# Patient Record
Sex: FEMALE | Race: WHITE | Employment: FULL TIME | URBAN - NONMETROPOLITAN AREA
[De-identification: names, ages, dates, MRNs, and addresses within clinical notes are randomized per-mention and may not be internally consistent; named-entity substitution may affect disease eponyms.]

---

## 2018-07-09 ENCOUNTER — HOSPITAL ENCOUNTER (OUTPATIENT)
Dept: WOMENS IMAGING | Age: 53
Discharge: HOME | End: 2018-07-11
Payer: COMMERCIAL

## 2018-07-09 DIAGNOSIS — Z12.31 VISIT FOR SCREENING MAMMOGRAM: ICD-10-CM

## 2018-07-09 PROCEDURE — 77067 SCR MAMMO BI INCL CAD: CPT

## 2018-10-08 ENCOUNTER — HOSPITAL ENCOUNTER (OUTPATIENT)
Age: 53
Discharge: HOME | End: 2018-10-08
Payer: COMMERCIAL

## 2018-10-08 DIAGNOSIS — E11.9 TYPE 2 DIABETES MELLITUS WITHOUT COMPLICATION, WITHOUT LONG-TERM CURRENT USE OF INSULIN (HCC): ICD-10-CM

## 2018-10-08 LAB
CREATININE URINE: 247.2 MG/DL (ref 28–217)
MICROALBUMIN/CREAT 24H UR: <12 MG/L
MICROALBUMIN/CREAT UR-RTO: ABNORMAL MCG/MG CREAT

## 2018-10-08 PROCEDURE — 82043 UR ALBUMIN QUANTITATIVE: CPT

## 2018-10-08 PROCEDURE — 82570 ASSAY OF URINE CREATININE: CPT

## 2019-04-10 ENCOUNTER — HOSPITAL ENCOUNTER (OUTPATIENT)
Age: 54
Discharge: HOME | End: 2019-04-10
Payer: COMMERCIAL

## 2019-04-10 DIAGNOSIS — E11.9 TYPE 2 DIABETES MELLITUS WITHOUT COMPLICATION, WITHOUT LONG-TERM CURRENT USE OF INSULIN (HCC): ICD-10-CM

## 2019-04-10 LAB
ESTIMATED AVERAGE GLUCOSE: 283 MG/DL
HBA1C MFR BLD: 11.5 % (ref 4.8–5.9)

## 2019-04-10 PROCEDURE — 83036 HEMOGLOBIN GLYCOSYLATED A1C: CPT

## 2019-04-10 PROCEDURE — 36415 COLL VENOUS BLD VENIPUNCTURE: CPT

## 2019-09-11 ENCOUNTER — HOSPITAL ENCOUNTER (OUTPATIENT)
Dept: NON INVASIVE DIAGNOSTICS | Age: 54
Discharge: HOME | End: 2019-09-11
Payer: COMMERCIAL

## 2019-09-11 DIAGNOSIS — I20.8 ANGINAL EQUIVALENT (HCC): ICD-10-CM

## 2019-09-11 PROCEDURE — 3430000000 HC RX DIAGNOSTIC RADIOPHARMACEUTICAL: Performed by: INTERNAL MEDICINE

## 2019-09-11 PROCEDURE — A9500 TC99M SESTAMIBI: HCPCS | Performed by: INTERNAL MEDICINE

## 2019-09-11 PROCEDURE — 93017 CV STRESS TEST TRACING ONLY: CPT

## 2019-09-11 RX ADMIN — Medication 30 MILLICURIE: at 08:42

## 2019-09-12 ENCOUNTER — HOSPITAL ENCOUNTER (OUTPATIENT)
Dept: NON INVASIVE DIAGNOSTICS | Age: 54
Discharge: HOME | End: 2019-09-12
Payer: COMMERCIAL

## 2019-09-12 PROCEDURE — A9500 TC99M SESTAMIBI: HCPCS | Performed by: INTERNAL MEDICINE

## 2019-09-12 PROCEDURE — 3430000000 HC RX DIAGNOSTIC RADIOPHARMACEUTICAL: Performed by: INTERNAL MEDICINE

## 2019-09-12 PROCEDURE — 78452 HT MUSCLE IMAGE SPECT MULT: CPT

## 2019-09-12 RX ADMIN — Medication 30 MILLICURIE: at 12:58

## 2019-09-13 NOTE — PROCEDURES
[address], [place] [address]                              CARDIAC STRESS TEST    PATIENT NAME: Ari Owens                      :        1965  MED REC NO:   [id number]                              ROOM:  ACCOUNT NO:   [de-identified]                           ADMIT DATE: 2019  PROVIDER:     Onelia Vann    CARDIOVASCULAR DIAGNOSTIC DEPARTMENT    DATE OF STUDY:  2019    ORDERING PROVIDER:  Agustin Schwartz. Bee Sauer MD    PRIMARY CARE PROVIDER:  Agustin Schwartz. Bee Sauer MD    INTERPRETING PHYSICIAN:  Onelia Vann MD    EXERCISE MYOCARDIAL PERFUSION STRESS TEST REPORT    Stress/Rest single-isotope SPECT imaging with exercise stress and gated  SPECT imaging    INDICATION:  Assessment of a cardiac cause of:  Angina, right neck pain. CLINICAL HISTORY:  The patient is a 59-year-old woman with no known  coronary artery disease. Previous cardiac history includes:  None. Other previous history includes:  Fatigue, DM, hypertension. Family  history of CAD <60 in father and brother. Father CABG in his 62s. Symptoms just prior to testing included:  None. Relevant medications:  Ziac. PROCEDURE:  The patient performed treadmill exercise using a Addison  protocol, completing 5:03 minutes and completing an estimated workload  of 9.08 metabolic equivalents (METS). The test was terminated due to fatigue, shortness of breath, right neck  pain. The heart rate was 82 beats per minute at baseline and increased to 151  beats per minute at peak exercise, which was 90% of the maximum  predicted heart rate. The rest blood pressure was 126/68 mmHg and  increased to 192/70 mmHg, which is a hypertensive response. During the  procedure, the patient developed fatigue, shortness of breath, leg  fatigue, and neck pain 1/3 but denied any chest discomfort.     Myocardial perfusion imaging: Imaging was performed at rest 30-45  minutes following the

## 2019-09-27 ENCOUNTER — HOSPITAL ENCOUNTER (OUTPATIENT)
Age: 54
Discharge: HOME | End: 2019-09-27
Payer: COMMERCIAL

## 2019-09-27 ENCOUNTER — OFFICE VISIT (OUTPATIENT)
Dept: CARDIOLOGY | Age: 54
End: 2019-09-27
Payer: COMMERCIAL

## 2019-09-27 VITALS
HEART RATE: 72 BPM | DIASTOLIC BLOOD PRESSURE: 81 MMHG | BODY MASS INDEX: 29 KG/M2 | WEIGHT: 153.6 LBS | RESPIRATION RATE: 16 BRPM | SYSTOLIC BLOOD PRESSURE: 138 MMHG | HEIGHT: 61 IN | OXYGEN SATURATION: 99 %

## 2019-09-27 DIAGNOSIS — E78.2 MIXED HYPERLIPIDEMIA: ICD-10-CM

## 2019-09-27 DIAGNOSIS — R94.39 ABNORMAL STRESS TEST: Primary | ICD-10-CM

## 2019-09-27 DIAGNOSIS — I10 ESSENTIAL HYPERTENSION: ICD-10-CM

## 2019-09-27 DIAGNOSIS — R94.39 ABNORMAL STRESS TEST: ICD-10-CM

## 2019-09-27 LAB
ANION GAP SERPL CALCULATED.3IONS-SCNC: 10 MMOL/L (ref 9–17)
BUN BLDV-MCNC: 18 MG/DL (ref 6–20)
BUN/CREAT BLD: 25 (ref 9–20)
CALCIUM SERPL-MCNC: 9.9 MG/DL (ref 8.6–10.4)
CHLORIDE BLD-SCNC: 101 MMOL/L (ref 98–107)
CO2: 28 MMOL/L (ref 20–31)
CREAT SERPL-MCNC: 0.72 MG/DL (ref 0.5–0.9)
GFR AFRICAN AMERICAN: >60 ML/MIN
GFR NON-AFRICAN AMERICAN: >60 ML/MIN
GFR SERPL CREATININE-BSD FRML MDRD: ABNORMAL ML/MIN/{1.73_M2}
GFR SERPL CREATININE-BSD FRML MDRD: ABNORMAL ML/MIN/{1.73_M2}
GLUCOSE BLD-MCNC: 109 MG/DL (ref 70–99)
HCT VFR BLD CALC: 46.4 % (ref 36.3–47.1)
HEMOGLOBIN: 14.8 G/DL (ref 11.9–15.1)
MCH RBC QN AUTO: 29.8 PG (ref 25.2–33.5)
MCHC RBC AUTO-ENTMCNC: 31.9 G/DL (ref 28.4–34.8)
MCV RBC AUTO: 93.5 FL (ref 82.6–102.9)
NRBC AUTOMATED: 0 PER 100 WBC
PDW BLD-RTO: 12 % (ref 11.8–14.4)
PLATELET # BLD: 200 K/UL (ref 138–453)
PMV BLD AUTO: 10.1 FL (ref 8.1–13.5)
POTASSIUM SERPL-SCNC: 4.3 MMOL/L (ref 3.7–5.3)
RBC # BLD: 4.96 M/UL (ref 3.95–5.11)
SODIUM BLD-SCNC: 139 MMOL/L (ref 135–144)
WBC # BLD: 5.4 K/UL (ref 3.5–11.3)

## 2019-09-27 PROCEDURE — G8427 DOCREV CUR MEDS BY ELIG CLIN: HCPCS | Performed by: FAMILY MEDICINE

## 2019-09-27 PROCEDURE — G8417 CALC BMI ABV UP PARAM F/U: HCPCS | Performed by: FAMILY MEDICINE

## 2019-09-27 PROCEDURE — 93000 ELECTROCARDIOGRAM COMPLETE: CPT | Performed by: FAMILY MEDICINE

## 2019-09-27 PROCEDURE — 85027 COMPLETE CBC AUTOMATED: CPT

## 2019-09-27 PROCEDURE — 36415 COLL VENOUS BLD VENIPUNCTURE: CPT

## 2019-09-27 PROCEDURE — 99244 OFF/OP CNSLTJ NEW/EST MOD 40: CPT | Performed by: FAMILY MEDICINE

## 2019-09-27 PROCEDURE — 80048 BASIC METABOLIC PNL TOTAL CA: CPT

## 2019-09-27 NOTE — PROGRESS NOTES
bruit. Extremities: None. No cyanosis or clubbing. 2+ radial and carotid pulses. Distal extremity pulses: 2+ bilaterally. Neurological: Alertness and orientation as per Constitutional exam. No evidence of gross cranial nerve deficit. Coordination appeared normal.   Skin: Skin is warm and dry. There is no rash or diaphoresis. Psychiatric: She has a normal mood and affect. Her speech is normal and behavior is normal.      MOST RECENT LABS ON RECORD:   Lab Results   Component Value Date    CHOL 177 07/18/2019    TRIG 110 07/18/2019    HDL 47 07/18/2019     07/18/2019    K 3.8 07/18/2019    CL 98 07/18/2019    CREATININE 0.82 07/18/2019    BUN 19 07/18/2019    CO2 28 07/18/2019    LABA1C 6.2 08/19/2019    LABMICR CANNOT BE CALCULATED 10/08/2018       ASSESSMENT:        1. Abnormal stress test    2. Essential hypertension    3. Mixed hyperlipidemia         PLAN:       Abnormal Stress Test:    For these reasons, I recommended that the patient consider undergoing a cardiac catheterization with coronary angiography to assess their coronary anatomy and facilitate better treatment recommendations. I discussed the risks, benefits, and alternatives to the procedure including the risk of bleeding, contrast induced allergy and/or kidney damage, arrythmia, stroke, heart attack, death, or the need for further procedures. I also discussed the fact that although treatment with simple medical management is a potential treatment option in place of cardiac catheterization, I expressed my opinion that cardiac catheterization in order to define their coronary anatomy and rule out severe 3 vessel or left main coronary artery disease would significant help guide the most appropriate treatment strategy ranging from no treatment, to medications, stents, to even coronary bypass surgery. The patient verbalized understanding of the risks benefits and alternatives and stated that they would like to undergo the procedure.  We will Walter Snow MD, MS, F.A.C.C.  September 27, 2019

## 2019-09-30 ENCOUNTER — TELEPHONE (OUTPATIENT)
Dept: CARDIOLOGY | Age: 54
End: 2019-09-30

## 2019-10-01 DIAGNOSIS — R94.39 ABNORMAL STRESS TEST: Primary | ICD-10-CM

## 2019-10-01 DIAGNOSIS — I10 ESSENTIAL HYPERTENSION: ICD-10-CM

## 2019-10-17 ENCOUNTER — TELEPHONE (OUTPATIENT)
Dept: CARDIOLOGY | Age: 54
End: 2019-10-17

## 2019-10-17 LAB
ABSOLUTE BASO #: 0 X10E9/L (ref 0–0.9)
ABSOLUTE EOS #: 0.2 X10E9/L (ref 0–0.4)
ABSOLUTE LYMPH #: 1.9 X10E9/L (ref 1–3.5)
ABSOLUTE MONO #: 0.3 X10E9/L (ref 0–0.9)
ABSOLUTE NEUT #: 4.3 X10E9/L (ref 1.5–6.6)
ANION GAP SERPL CALCULATED.3IONS-SCNC: 9 MMOL/L (ref 4–12)
BASOPHILS RELATIVE PERCENT: 0.7 %
BUN BLDV-MCNC: 16 MG/DL (ref 5–23)
CALCIUM SERPL-MCNC: 9.9 MG/DL (ref 8.5–10.5)
CHLORIDE BLD-SCNC: 101 MMOL/L (ref 98–109)
CO2: 31 MMOL/L (ref 22–32)
CREAT SERPL-MCNC: 0.8 MG/DL (ref 0.4–1)
EGFR AFRICAN AMERICAN: >60 ML/MIN/1.73SQ.M
EGFR IF NONAFRICAN AMERICAN: >60 ML/MIN/1.73SQ.M
EOSINOPHILS RELATIVE PERCENT: 2.4 %
GLUCOSE: 169 MG/DL (ref 65–99)
HCT VFR BLD CALC: 43.6 % (ref 35–47)
HEMOGLOBIN: 15 G/DL (ref 11.7–16)
LYMPHOCYTE %: 28.5 %
MCH RBC QN AUTO: 31.2 PG (ref 26–33.5)
MCHC RBC AUTO-ENTMCNC: 34.4 G/DL (ref 32–36)
MCV RBC AUTO: 91 FL (ref 81–100)
MONOCYTES # BLD: 4.9 %
NEUTROPHILS RELATIVE PERCENT: 63.5 %
PDW BLD-RTO: 13.2 % (ref 11.5–14.7)
PLATELETS: 234 X10E9/L (ref 150–450)
PMV BLD AUTO: 8.8 FL (ref 7–12)
POTASSIUM SERPL-SCNC: 3.7 MMOL/L (ref 3.5–5)
RBC: 4.82 X10E12/L (ref 3.8–5.2)
SODIUM BLD-SCNC: 141 MMOL/L (ref 134–146)
WBC: 6.8 X10E9/L (ref 4.8–10.8)

## 2019-10-21 DIAGNOSIS — R94.39 ABNORMAL STRESS TEST: ICD-10-CM

## 2019-10-21 DIAGNOSIS — I10 ESSENTIAL HYPERTENSION: ICD-10-CM

## 2019-10-25 ENCOUNTER — HOSPITAL ENCOUNTER (OUTPATIENT)
Dept: CARDIAC CATH/INVASIVE PROCEDURES | Age: 54
Discharge: HOME | End: 2019-10-25
Payer: COMMERCIAL

## 2019-10-25 VITALS
WEIGHT: 153 LBS | OXYGEN SATURATION: 100 % | BODY MASS INDEX: 28.89 KG/M2 | TEMPERATURE: 96.9 F | DIASTOLIC BLOOD PRESSURE: 70 MMHG | RESPIRATION RATE: 16 BRPM | HEIGHT: 61 IN | SYSTOLIC BLOOD PRESSURE: 146 MMHG | HEART RATE: 90 BPM

## 2019-10-25 DIAGNOSIS — R94.39 ABNORMAL CARDIOVASCULAR STRESS TEST: ICD-10-CM

## 2019-10-25 LAB — GLUCOSE BLD-MCNC: 119 MG/DL (ref 65–105)

## 2019-10-25 PROCEDURE — 93458 L HRT ARTERY/VENTRICLE ANGIO: CPT | Performed by: FAMILY MEDICINE

## 2019-10-25 PROCEDURE — 2709999900 HC NON-CHARGEABLE SUPPLY

## 2019-10-25 PROCEDURE — C1894 INTRO/SHEATH, NON-LASER: HCPCS

## 2019-10-25 PROCEDURE — 82947 ASSAY GLUCOSE BLOOD QUANT: CPT

## 2019-10-25 PROCEDURE — C1769 GUIDE WIRE: HCPCS

## 2019-10-25 PROCEDURE — C1725 CATH, TRANSLUMIN NON-LASER: HCPCS

## 2019-10-25 PROCEDURE — 7100000001 HC PACU RECOVERY - ADDTL 15 MIN

## 2019-10-25 PROCEDURE — C1887 CATHETER, GUIDING: HCPCS

## 2019-10-25 PROCEDURE — 6360000002 HC RX W HCPCS

## 2019-10-25 PROCEDURE — 6360000004 HC RX CONTRAST MEDICATION

## 2019-10-25 PROCEDURE — 7100000000 HC PACU RECOVERY - FIRST 15 MIN

## 2019-10-25 PROCEDURE — 2500000003 HC RX 250 WO HCPCS

## 2019-10-25 RX ORDER — NITROGLYCERIN 0.4 MG/1
0.4 TABLET SUBLINGUAL EVERY 5 MIN PRN
Status: CANCELLED | OUTPATIENT
Start: 2019-10-25

## 2019-10-25 RX ORDER — SODIUM CHLORIDE 9 MG/ML
INJECTION, SOLUTION INTRAVENOUS CONTINUOUS
Status: CANCELLED | OUTPATIENT
Start: 2019-10-25

## 2019-10-25 RX ORDER — SODIUM CHLORIDE 0.9 % (FLUSH) 0.9 %
10 SYRINGE (ML) INJECTION PRN
Status: CANCELLED | OUTPATIENT
Start: 2019-10-25

## 2019-10-25 RX ORDER — SODIUM CHLORIDE 0.9 % (FLUSH) 0.9 %
10 SYRINGE (ML) INJECTION EVERY 12 HOURS SCHEDULED
Status: CANCELLED | OUTPATIENT
Start: 2019-10-25

## 2019-10-25 RX ORDER — ACETAMINOPHEN 325 MG/1
650 TABLET ORAL EVERY 4 HOURS PRN
Status: CANCELLED | OUTPATIENT
Start: 2019-10-25

## 2019-10-25 RX ORDER — SODIUM CHLORIDE 9 MG/ML
INJECTION, SOLUTION INTRAVENOUS CONTINUOUS
Status: DISCONTINUED | OUTPATIENT
Start: 2019-10-25 | End: 2019-10-26 | Stop reason: HOSPADM

## 2019-10-25 RX ORDER — DIPHENHYDRAMINE HCL 25 MG
50 TABLET ORAL ONCE
Status: CANCELLED | OUTPATIENT
Start: 2019-10-25 | End: 2019-10-25

## 2019-10-25 RX ADMIN — SODIUM CHLORIDE: 9 INJECTION, SOLUTION INTRAVENOUS at 14:06

## 2019-10-29 ENCOUNTER — HOSPITAL ENCOUNTER (OUTPATIENT)
Age: 54
Discharge: HOME | End: 2019-10-29
Payer: COMMERCIAL

## 2019-10-29 DIAGNOSIS — R06.09 DYSPNEA ON EXERTION: ICD-10-CM

## 2019-10-29 LAB — D-DIMER QUANTITATIVE: 23.59 MG/L FEU (ref 0.19–0.5)

## 2019-10-29 PROCEDURE — 36415 COLL VENOUS BLD VENIPUNCTURE: CPT

## 2019-10-29 PROCEDURE — 85379 FIBRIN DEGRADATION QUANT: CPT

## 2019-11-07 ENCOUNTER — HOSPITAL ENCOUNTER (OUTPATIENT)
Dept: VASCULAR LAB | Age: 54
Discharge: HOME | End: 2019-11-09
Payer: COMMERCIAL

## 2019-11-07 DIAGNOSIS — M54.2 NECK PAIN ON RIGHT SIDE: ICD-10-CM

## 2019-11-07 PROCEDURE — 93880 EXTRACRANIAL BILAT STUDY: CPT

## 2019-12-11 ENCOUNTER — HOSPITAL ENCOUNTER (OUTPATIENT)
Dept: LAB | Age: 54
Discharge: HOME | End: 2019-12-11
Payer: COMMERCIAL

## 2019-12-11 ENCOUNTER — HOSPITAL ENCOUNTER (OUTPATIENT)
Dept: CT IMAGING | Age: 54
Discharge: HOME | End: 2019-12-13
Payer: COMMERCIAL

## 2019-12-11 DIAGNOSIS — M54.2 NECK PAIN ON RIGHT SIDE: ICD-10-CM

## 2019-12-11 DIAGNOSIS — E11.9 TYPE 2 DIABETES MELLITUS WITHOUT COMPLICATION, WITHOUT LONG-TERM CURRENT USE OF INSULIN (HCC): ICD-10-CM

## 2019-12-11 LAB
BUN BLDV-MCNC: 17 MG/DL (ref 6–20)
CREAT SERPL-MCNC: 0.73 MG/DL (ref 0.5–0.9)
GFR AFRICAN AMERICAN: >60 ML/MIN
GFR NON-AFRICAN AMERICAN: >60 ML/MIN
GFR SERPL CREATININE-BSD FRML MDRD: NORMAL ML/MIN/{1.73_M2}
GFR SERPL CREATININE-BSD FRML MDRD: NORMAL ML/MIN/{1.73_M2}

## 2019-12-11 PROCEDURE — 82565 ASSAY OF CREATININE: CPT

## 2019-12-11 PROCEDURE — 84520 ASSAY OF UREA NITROGEN: CPT

## 2019-12-11 PROCEDURE — 36415 COLL VENOUS BLD VENIPUNCTURE: CPT

## 2019-12-11 PROCEDURE — 6360000004 HC RX CONTRAST MEDICATION: Performed by: INTERNAL MEDICINE

## 2019-12-11 PROCEDURE — 71275 CT ANGIOGRAPHY CHEST: CPT

## 2019-12-11 RX ADMIN — IOPAMIDOL 100 ML: 755 INJECTION, SOLUTION INTRAVENOUS at 09:57

## 2019-12-24 ENCOUNTER — HOSPITAL ENCOUNTER (OUTPATIENT)
Dept: ULTRASOUND IMAGING | Age: 54
Discharge: HOME | End: 2019-12-26
Payer: COMMERCIAL

## 2019-12-24 DIAGNOSIS — R22.1 LUMP IN NECK: ICD-10-CM

## 2019-12-24 DIAGNOSIS — M54.2 NECK PAIN ON RIGHT SIDE: ICD-10-CM

## 2019-12-24 PROCEDURE — 76536 US EXAM OF HEAD AND NECK: CPT

## 2020-01-09 ENCOUNTER — OFFICE VISIT (OUTPATIENT)
Dept: OTOLARYNGOLOGY | Age: 55
End: 2020-01-09
Payer: COMMERCIAL

## 2020-01-09 VITALS
SYSTOLIC BLOOD PRESSURE: 122 MMHG | DIASTOLIC BLOOD PRESSURE: 70 MMHG | WEIGHT: 159.4 LBS | HEIGHT: 61 IN | OXYGEN SATURATION: 97 % | BODY MASS INDEX: 30.09 KG/M2 | RESPIRATION RATE: 18 BRPM | HEART RATE: 87 BPM | TEMPERATURE: 98.3 F

## 2020-01-09 PROCEDURE — 99203 OFFICE O/P NEW LOW 30 MIN: CPT | Performed by: PHYSICIAN ASSISTANT

## 2020-01-09 ASSESSMENT — ENCOUNTER SYMPTOMS
ANAL BLEEDING: 0
RECTAL PAIN: 0
EYE DISCHARGE: 0
ABDOMINAL DISTENTION: 0
COLOR CHANGE: 0
FACIAL SWELLING: 0
BLOOD IN STOOL: 0
RHINORRHEA: 0
EYE PAIN: 0
BACK PAIN: 1
ABDOMINAL PAIN: 0
TROUBLE SWALLOWING: 0
EYE ITCHING: 0
STRIDOR: 0
DIARRHEA: 0
COUGH: 0
NAUSEA: 0
CHOKING: 0
APNEA: 0
EYE REDNESS: 0
PHOTOPHOBIA: 0
CONSTIPATION: 0
CHEST TIGHTNESS: 0
SORE THROAT: 0
SHORTNESS OF BREATH: 1
SINUS PRESSURE: 0
VOICE CHANGE: 0
VOMITING: 0
SINUS PAIN: 0
WHEEZING: 0

## 2020-01-09 NOTE — PROGRESS NOTES
Associated symptoms/other symptoms:  Denies dry mouth. No oral cavity swelling or unpleasant taste. Denies facial weakness. Feels like numbness on right side of face in front of ear. Denies hoarseness. Denies dysphagia. Denies throat pain. Denies ear pain. Denies weight loss now. Had 10 lbs of weight loss around 4/2019, but gained it back after starting metformin. No overt axillary adenopathy. Denies fever and chills. Does acknowledge night sweats, but relates these to menopause. Will wake up and take covers off due to being hot and then gets cold so puts the covers back on. \"Lump\" in front of right ear and makes her feel the pain more. \"Once in awhile\" \"a little pain\" of the right posterior neck. Denies arm weakness. Denies arm numbness. Demonstrates that it can even hurt right upper arm if trying to abduct it. Risk factors/other information:  Denies Hx of head injury. Denies Hx of neck injury, including no whiplash injury. Never a smoker. Brother with lymphoma (mantle cell). Pt is a diabetic. Supposed to be taking simvastatin. Has had cardiac work-up including stress test, cardiac catheterization (10/25/19; no intervention indicated at that time per Pt), and carotid US (no surgical disease per Pt). Alleviating factors:   Hasn't tried heat or cold application. Initially indicates nothing tried. \"Once in awhile\" takes APAP or ibuprofen and helps \"somewhat\". Aggravating factors:  As noted above    US HEAD NECK SOFT TISSUE 12/24/19  FINDINGS:  In the area of palpable lump within the right submandibular region, a prominent benign-appearing lymph node is identified measuring 1.3 x 1.0 x 0.6 cm. No loss of fatty hilum is noted. No abnormal cortical thickening is seen. No solid mass or fluid collection is noted. IMPRESSION:  In the area of palpable lump within the right submandibular region, a prominent benign-appearing lymph node is identified measuring 1.3 x 1.0 x 0.6 cm. Finding is likely reactive. No solid mass or fluid collection is noted. CTA CHEST WITH CONTRAST 12/11/19  FINDINGS:    Aorta:  No evidence of thoracic aortic aneurysm or dissection. No acute abnormality of the aorta. Mediastinum:  Pulmonary trunk appears nondilated. Heart appears normal size without pericardial effusion. No lymphadenopathy is seen. The esophagus is grossly unremarkable. Visualized portions of the thyroid gland appears enlarged. Lungs/Pleura:  Expiratory phase of imaging. No focal consolidation, pneumothorax or pleural effusion. Mild atelectasis/scarring involving the lingula. Trachea and distal airways appear patent. No suspicious noncalcified lung nodule or mass. Upper Abdomen:  No acute findings. Visualized adrenal glands appear grossly unremarkable. Soft Tissues/Bones:  Visualized soft tissues surrounding the chest wall demonstrate no acute findings. Osseous structures demonstrate mild degenerative change. IMPRESSION:  1. No acute cardiopulmonary process. 2. Mild atelectasis/scarring involving the lingula. Scleral redness incidental findings on exam.  Pt indicates this is related to waking up early and her contacts. Says her eyes are like this all the time. Review of systems covering 10 systems is reviewed as staff entry in other note and pertinent positives and negatives noted. Past Medical History:   Diagnosis Date    Allergic rhinitis     Asthma     DM type 2 (diabetes mellitus, type 2) ([facility] [address] 2012    Essential hypertension     H/O cardiovascular stress test 09/11/2019    Abnormal myocardial perfusion study. There is a small/moderate perfusino defect of mild/moderate intensity in the inferolateral, apical regions during stress imaging, which is most consistent with ischemia but may be due to artifact. In addition TID of the LV was seen. (TID 1.23).  The pt duke treadmill score is 0, which correlates with a intermediate risk for significant CAD.     H/O seasonal allergies     Mixed hyperlipidemia     MVP (mitral valve prolapse)        Current Outpatient Medications:     blood glucose test strips (FREESTYLE LITE) strip, TEST ONE TIMES A DAY & AS NEEDED FOR SYMPTOMS OF IRREGULAR BLOOD GLUCOSE., Disp: 50 strip, Rfl: 3    bisoprolol-hydrochlorothiazide (ZIAC) 5-6.25 MG per tablet, Take 1 tablet by mouth daily, Disp: 90 tablet, Rfl: 3    metFORMIN (GLUCOPHAGE-XR) 500 MG extended release tablet, Take 1 tablet by mouth daily (with breakfast), Disp: 360 tablet, Rfl: 3    glimepiride (AMARYL) 4 MG tablet, Take 1 tablet by mouth every morning (before breakfast), Disp: 180 tablet, Rfl: 3    Blood Glucose Monitoring Suppl (FREESTYLE LITE) FIGUEROA, USE DAILY WITH TEST STRIPS AND LANCETS, Disp: , Rfl: 0    Lancets 30G MISC, 1 each by Does not apply route daily, Disp: 100 each, Rfl: 3    glucose monitoring kit (FREESTYLE) monitoring kit, 1 kit by Does not apply route daily With test strips and supplies /lancets, Disp: 1 kit, Rfl: 0    cetirizine (ZYRTEC) 10 MG tablet, Take 10 mg by mouth daily. , Disp: , Rfl:     aspirin 81 MG tablet, Take 81 mg by mouth daily, Disp: , Rfl:    Allergies   Allergen Reactions    Pcn [Penicillins] Rash      Past Surgical History:   Procedure Laterality Date    BONE CYST EXCISION      tailbone    CARDIAC CATHETERIZATION  10/25/2019     SECTION      COLONOSCOPY  1996    colon polyps    WISDOM TOOTH EXTRACTION        Social History     Socioeconomic History    Marital status:      Spouse name: Not on file    Number of children: Not on file    Years of education: Not on file    Highest education level: Not on file   Occupational History    Not on file   Social Needs    Financial resource strain: Not hard at all   Wanderu insecurity:     Worry: Never true     Inability: Never true   [employer] needs:     Medical: No     Non-medical: No   Tobacco Use    Smoking status: Never Smoker    Smokeless tobacco: Never Used stones of Marcio's ducts    Tender in area of right submandibular area but no submandibular gland masses or submandibular gland enlargement    Temporomandibular Joint: no crepitus with motion, no tenderness on palpation, no trismus, motion symmetric    EYES:  PERRL, extra-ocular movements intact, no nystagmus, redness of sclera bilaterally, no watering of eyes    EARS:    Bilateral External Ears: no pits, no tags    Right External Ear: normally formed, no lesions; no mastoid tenderness, redness or swelling    Left External Ear: normally formed, no lesions; no mastoid tenderness, redness or swelling    Right External Auditory Canal: normally formed, no redness, no swelling, healthy skin, no obstructing cerumen, no discharge    Left External Auditory Canal: normally formed, no redness, no swelling, healthy skin, no obstructing cerumen, no discharge    Right Tympanic Membrane: normal landmarks, translucent, mobile to pneumatic otoscopy, no perforation, no effusion    Left Tympanic Membrane: normal landmarks, translucent, mobile to pneumatic otoscopy, no perforation, no effusion    Hearing: intact to spoken voice    NOSE:    Nasal Skin: no lesions, no lacerations, no scars    Nasal Dorsum: symmetric with no visible or palpable deformities    Nasal Tip: normal symmetric nasal tip, normal nasal valves    Nasal Mucosa: normal, pink, some portions dry and some moist    Septum: not markedly deformed, midline, no exposed vessels, no bleeding, no septal granuloma, no perforation    Turbinates: normal size and conformation, no swelling    ORAL CAVITY/MOUTH:    Lips, teeth, gums: normal lips, normal gums, dentition intact    Oral Mucosa: normal, moist, no lesions    Palate: normal hard palate, normal soft palate, symmetric palatal elevation    Floor of Mouth: normal floor of mouth    Tongue: normal tongue, no lesions, no edema, no masses, normal mucosa, mobile    Tonsils: no significant tonsillar tissue noted, no lesions or masses, no erythema, some scant whitish debris (can't say pus) of the right side    Posterior pharynx: normal, no masses or lesions, no erythema    NECK:    Neck: no masses, trachea midline, functional active range of motion, no cysts or pits, tenderness to palpation right anterior neck (area of tenderness is not pulsatile), the tenderness is anterior to the SCM muscle and SCM muscle not indicated as being tender, no tenderness left side  Good AROM with extension, flexion, lateral rotation bilaterally, and lateral flexion bilaterally. During lateral rotation either to the left or right Pt had reported pain radiating down from the posterior neck to upper back (I think it was just reported with rotation to the left). Thyroid: normal thyroid, no enlargement, no tenderness, no nodules    LYMPH NODES:    Cervical: no palpable lymph node enlargement    RESPIRATORY:    Inspection/Auscultation: good air movement, chest expands symmetrically, normal breath sounds, no wheezing, no stridor, no crackles, no rhonchi    CARDIOVASCULAR SYSTEM:  Heart regular rate and rhythm, normal S1 and S2, no m/r/g  No carotid thrills, no carotid bruits    Observation/Palpation of Peripheral Vascular System:  no cyanosis, no edema    SKIN:    General Appearance:  warm and dry    NEUROLOGICAL SYSTEM:    Orientation: oriented to time, oriented to place, oriented to person, oriented to situation    Cranial Nerves: Cranial Nerves II-XII intact, normal facial movement     strength 5/5 bilaterally    PSYCHIATRIC:    Mood and affect:  Affect appropriate for situation/setting        Assessment and Plan:  Chronic recurring pain of right anterior neck is a daily occurrence, but not constant. Given this persisting neck pain CT for evaluation ordered. I am uncertain of cause. Pt had borderline enlarged cervical node on prior US (as noted above), but I can't say I feel overt adenopathy on exam today.   I feel right side comparable to left side when palpating neck and without masses but Pt tender in the submandibular area. Considered atypical cardiac symptoms for the pain complaints, but has already had cardiac work-up including stress test, carotid US, and heart catheterization to rule this out. Considered carotidynia, but Pt not localizing what she is feeling to the carotid and area of tenderness not pulsatile. Musculoskeletal cause considered. Neuralgia considered. Nerve compression considered as the cause. Considered sialoadenitis, but I can't say based on H & P today that this is what's going on. Pain not localized to the thyroid. Pt inquired about reasons for enlarged nodes and we discussed infectious, malignant, inflammatory disease (like sarcoidosis)  Discussed with Pt that in the setting of a persisting enlarged lymph node that needle biopsy and/or excisional biopsy would be advised. The patient and/or caregiver is to notify the office if no improvement or worsening of symptoms is noted prior to the scheduled follow-up for sooner evaluation. The patient and/or caregiver is able to state an understanding of these recommendations and is agreeable to the treatment plan. Diagnosis Orders   1. Neck pain  CT SOFT TISSUE NECK W CONTRAST    Right anterior neck pain. 2. Enlarged lymph node  CT SOFT TISSUE NECK W CONTRAST    Marginally larged on recent neck US.

## 2020-01-17 ENCOUNTER — TELEPHONE (OUTPATIENT)
Dept: ENT CLINIC | Age: 55
End: 2020-01-17

## 2020-01-22 NOTE — TELEPHONE ENCOUNTER
I was able to contact Pt by calling [phone]. Has gotten bigger since I saw Pt. Right side bigger since last visit and even noticing enlargement on the left side. You can actually see it per Pt (versus just feeling it). Seems like it has moved up to more under her jaw. Denies that it is of her lower neck. No problems with breathing or swallowing. No fever. Pain is still intermittent and radiates down the collar bone to arm (pain just on the right side). In the last week a few nosebleeds. No association of swelling with eating or drinking. No pain on the left side. Discussed with Pt getting repeat US for worsening enlargement and will go from their. Discussed can have her follow-up with Dr. Mohamud Black as well to see if biopsy indicated. The plan is for her to get the [address] [facility] and then I will call with results and we will go from there.

## 2020-01-23 ENCOUNTER — HOSPITAL ENCOUNTER (OUTPATIENT)
Dept: ULTRASOUND IMAGING | Age: 55
Discharge: HOME | End: 2020-01-25
Payer: COMMERCIAL

## 2020-01-23 PROCEDURE — 76536 US EXAM OF HEAD AND NECK: CPT

## 2020-01-24 ENCOUNTER — TELEPHONE (OUTPATIENT)
Dept: SURGERY | Age: 55
End: 2020-01-24

## 2020-01-27 ENCOUNTER — TELEPHONE (OUTPATIENT)
Dept: ENT CLINIC | Age: 55
End: 2020-01-27

## 2020-01-27 NOTE — TELEPHONE ENCOUNTER
I called Pt and discussed US results with her. Pt initially noticed a lump/bump in right submandibular area in November 2019. Prior US x 2 indicates it to be a benign-appearing lymph node. It is minimally enlarged on US (1.3 cm), but is persisting and we discussed follow-up with Dr. Lindsey Meza to discuss possible biopsy. She had an initial US on 12/24/19 indicating a prominent benign-appearing lymph node in the right submandibular region measuring 1.3 x 1.0 x 0.6 cm (in the area where Pt has felt the palpable lump). Repeat US done on 1/23/20 because Pt had reported worsening enlargement/swelling of the affected area on the right anterior neck (where the palpable lump is) and had also noticed new enlargement on the left side in the same area as where she feels the lump/bump on the right side. Follow-up US on 1/23/20 indicates unchanged node on the right. Plan is for Pt to have her follow-up with Dr. Lindsey Meza to discuss possible biopsy. Pt advised that at the least it should be monitored clinically. CT of the neck had been denied by insurance. Discussed thyroid US findings as well and will place order for repeat US in 1 year. Pt denies family Hx of thyroid CA and MEN. She does have family Hx of thyroid problems. She denies Hx of radiation exposure outside of imaging studies. Discussed that in the future if change on US or clinically that it may be recommended that thyroid nodule(s) are biopsied.

## 2020-01-29 ENCOUNTER — OFFICE VISIT (OUTPATIENT)
Dept: ENT CLINIC | Age: 55
End: 2020-01-29
Payer: COMMERCIAL

## 2020-01-29 VITALS
BODY MASS INDEX: 30.21 KG/M2 | DIASTOLIC BLOOD PRESSURE: 70 MMHG | WEIGHT: 160 LBS | HEIGHT: 61 IN | HEART RATE: 76 BPM | SYSTOLIC BLOOD PRESSURE: 124 MMHG | TEMPERATURE: 97.7 F | RESPIRATION RATE: 20 BRPM

## 2020-01-29 PROCEDURE — 99214 OFFICE O/P EST MOD 30 MIN: CPT | Performed by: OTOLARYNGOLOGY

## 2020-01-29 ASSESSMENT — ENCOUNTER SYMPTOMS
CONSTIPATION: 0
COUGH: 0
BACK PAIN: 0
CHOKING: 0
SORE THROAT: 0
RHINORRHEA: 0
EYE ITCHING: 0
ABDOMINAL DISTENTION: 0
PHOTOPHOBIA: 0
COLOR CHANGE: 0
VOMITING: 0
EYE DISCHARGE: 0
ABDOMINAL PAIN: 0
SINUS PRESSURE: 0
RECTAL PAIN: 0
TROUBLE SWALLOWING: 0
CHEST TIGHTNESS: 0
APNEA: 0
SINUS PAIN: 0
NAUSEA: 0
DIARRHEA: 0
EYE REDNESS: 0
STRIDOR: 0
EYE PAIN: 0
VOICE CHANGE: 0
FACIAL SWELLING: 0
BLOOD IN STOOL: 0
WHEEZING: 0
SHORTNESS OF BREATH: 1
ANAL BLEEDING: 0

## 2020-01-29 NOTE — PROGRESS NOTES
Review of Systems   Constitutional: Negative for activity change, appetite change, chills, diaphoresis, fatigue, fever and unexpected weight change. HENT: Negative for congestion, dental problem, drooling, ear discharge, ear pain, facial swelling, hearing loss, mouth sores, nosebleeds, postnasal drip, rhinorrhea, sinus pressure, sinus pain, sneezing, sore throat, tinnitus, trouble swallowing and voice change. Eyes: Negative for photophobia, pain, discharge, redness, itching and visual disturbance. Respiratory: Positive for shortness of breath (with exercise/activity). Negative for apnea, cough, choking, chest tightness, wheezing and stridor. Cardiovascular: Negative for chest pain, palpitations and leg swelling. Gastrointestinal: Negative for abdominal distention, abdominal pain, anal bleeding, blood in stool, constipation, diarrhea, nausea, rectal pain and vomiting. Endocrine: Negative for cold intolerance, heat intolerance, polydipsia, polyphagia and polyuria. Genitourinary: Negative for decreased urine volume, difficulty urinating, dyspareunia, dysuria, enuresis, flank pain, frequency, genital sores, hematuria, menstrual problem, pelvic pain, urgency, vaginal bleeding, vaginal discharge and vaginal pain. Musculoskeletal: Positive for neck pain. Negative for arthralgias, back pain, gait problem, joint swelling, myalgias and neck stiffness. Skin: Negative for color change, pallor, rash and wound. Allergic/Immunologic: Negative for environmental allergies, food allergies and immunocompromised state. Neurological: Negative for dizziness, tremors, seizures, syncope, facial asymmetry, speech difficulty, weakness, light-headedness, numbness and headaches. Hematological: Negative for adenopathy. Does not bruise/bleed easily.    Psychiatric/Behavioral: Negative for agitation, behavioral problems, confusion, decreased concentration, dysphoric mood, hallucinations, self-injury, sleep disturbance and
use: No    Sexual activity: Not on file   Lifestyle    Physical activity:     Days per week: Not on file     Minutes per session: Not on file    Stress: Not on file   Relationships    Social connections:     Talks on phone: Not on file     Gets together: Not on file     Attends [employer] service: Not on file     Active member of club or organization: Not on file     Attends meetings of clubs or organizations: Not on file     Relationship status: Not on file    Intimate partner violence:     Fear of current or ex partner: Not on file     Emotionally abused: Not on file     Physically abused: Not on file     Forced sexual activity: Not on file   Other Topics Concern    Not on file   Social History Narrative    Not on file     Family History   Problem Relation Age of Onset    High Cholesterol Mother     Thyroid Disease Mother     Liver Disease Mother     High Blood Pressure Mother     High Blood Pressure Father     Heart Disease Father     Diabetes Brother     Brain Cancer Brother     Lung Cancer Brother     Ovarian Cancer Paternal Grandmother         PHYSICAL EXAM:    The patient was examined today 1/29/2020 with findings as follows:    CONSTITUTIONAL:    General Appearance: well-appearing, nontoxic, alert, no acute distress     Communication: understanding at normal conversational tones, normal voicing, speech intelligible    HEAD/FACE:    Head: atraumatic, normocephalic, no lesions    Facial Inspection: no lesions, healthy skin    Facial Strength: motor strength normal, symmetric strength, symmetric movement, motor strength    Sinuses: no sinus tenderness    Salivary Glands: no enlargements of parotid glands, no tenderness of parotid glands, no masses of parotid glands, clear salivary flow on palpation from Stensen's ducts, no duct stones of Stensen's duct, no enlargement of submandibular glands, no tenderness of submandibular glands, no masses of submandibular glands, clear salivary flow from

## 2020-02-07 ENCOUNTER — HOSPITAL ENCOUNTER (OUTPATIENT)
Dept: WOMENS IMAGING | Age: 55
Discharge: HOME | End: 2020-02-09
Payer: COMMERCIAL

## 2020-02-07 PROCEDURE — 77063 BREAST TOMOSYNTHESIS BI: CPT

## 2020-07-29 ENCOUNTER — OFFICE VISIT (OUTPATIENT)
Dept: ENT CLINIC | Age: 55
End: 2020-07-29
Payer: COMMERCIAL

## 2020-07-29 VITALS
TEMPERATURE: 97.4 F | HEIGHT: 61 IN | WEIGHT: 160 LBS | SYSTOLIC BLOOD PRESSURE: 112 MMHG | RESPIRATION RATE: 20 BRPM | HEART RATE: 88 BPM | DIASTOLIC BLOOD PRESSURE: 74 MMHG | BODY MASS INDEX: 30.21 KG/M2

## 2020-07-29 PROCEDURE — 99213 OFFICE O/P EST LOW 20 MIN: CPT | Performed by: OTOLARYNGOLOGY

## 2020-07-29 ASSESSMENT — ENCOUNTER SYMPTOMS
CHEST TIGHTNESS: 0
EYE REDNESS: 0
ANAL BLEEDING: 0
SINUS PRESSURE: 0
VOICE CHANGE: 0
SINUS PAIN: 0
APNEA: 0
SHORTNESS OF BREATH: 0
STRIDOR: 0
CONSTIPATION: 0
DIARRHEA: 0
CHOKING: 0
PHOTOPHOBIA: 0
SORE THROAT: 0
COUGH: 0
BACK PAIN: 0
COLOR CHANGE: 0
RHINORRHEA: 0
ABDOMINAL DISTENTION: 0
BLOOD IN STOOL: 0
NAUSEA: 0
EYE DISCHARGE: 0
VOMITING: 0
EYE PAIN: 0
FACIAL SWELLING: 0
WHEEZING: 0
RECTAL PAIN: 0
TROUBLE SWALLOWING: 0
EYE ITCHING: 0
ABDOMINAL PAIN: 0

## 2020-07-29 NOTE — PROGRESS NOTES
Review of Systems   Constitutional: Negative for activity change, appetite change, chills, diaphoresis, fatigue, fever and unexpected weight change. HENT: Negative for congestion, dental problem, drooling, ear discharge, ear pain, facial swelling, hearing loss, mouth sores, nosebleeds, postnasal drip, rhinorrhea, sinus pressure, sinus pain, sneezing, sore throat, tinnitus, trouble swallowing and voice change. Eyes: Negative for photophobia, pain, discharge, redness, itching and visual disturbance. Respiratory: Negative for apnea, cough, choking, chest tightness, shortness of breath, wheezing and stridor. Cardiovascular: Negative for chest pain, palpitations and leg swelling. Gastrointestinal: Negative for abdominal distention, abdominal pain, anal bleeding, blood in stool, constipation, diarrhea, nausea, rectal pain and vomiting. Endocrine: Negative for cold intolerance, heat intolerance, polydipsia, polyphagia and polyuria. Genitourinary: Negative for decreased urine volume, difficulty urinating, dyspareunia, dysuria, enuresis, flank pain, frequency, genital sores, hematuria, menstrual problem, pelvic pain, urgency, vaginal bleeding, vaginal discharge and vaginal pain. Musculoskeletal: Negative for arthralgias, back pain, gait problem, joint swelling, myalgias, neck pain and neck stiffness. Skin: Negative for color change, pallor, rash and wound. Allergic/Immunologic: Negative for environmental allergies, food allergies and immunocompromised state. Neurological: Negative for dizziness, tremors, seizures, syncope, facial asymmetry, speech difficulty, weakness, light-headedness, numbness and headaches. Hematological: Negative for adenopathy. Does not bruise/bleed easily. Psychiatric/Behavioral: Negative for agitation, behavioral problems, confusion, decreased concentration, dysphoric mood, hallucinations, self-injury, sleep disturbance and suicidal ideas.  The patient is not nervous/anxious and is not hyperactive.

## 2020-07-29 NOTE — PROGRESS NOTES
Stress: Not on file   Relationships    Social connections     Talks on phone: Not on file     Gets together: Not on file     Attends Mosque service: Not on file     Active member of club or organization: Not on file     Attends meetings of clubs or organizations: Not on file     Relationship status: Not on file    Intimate partner violence     Fear of current or ex partner: Not on file     Emotionally abused: Not on file     Physically abused: Not on file     Forced sexual activity: Not on file   Other Topics Concern    Not on file   Social History Narrative    Not on file     Family History   Problem Relation Age of Onset    High Cholesterol Mother     Thyroid Disease Mother     Liver Disease Mother     High Blood Pressure Mother     High Blood Pressure Father     Heart Disease Father     Diabetes Brother     Brain Cancer Brother     Lung Cancer Brother     Ovarian Cancer Paternal Grandmother         PHYSICAL EXAM:    The patient was examined today 7/29/2020 with findings as follows:    CONSTITUTIONAL:    General Appearance: well-appearing, nontoxic, alert, no acute distress     Communication: understanding at normal conversational tones, normal voicing, speech intelligible    HEAD/FACE:    Head: atraumatic, normocephalic, no lesions    Facial Inspection: no lesions, healthy skin    Facial Strength: motor strength normal, symmetric strength, symmetric movement, motor strength    Sinuses: no sinus tenderness    Salivary Glands: no enlargements of parotid glands, no tenderness of parotid glands, no masses of parotid glands, clear salivary flow on palpation from Stensen's ducts, no duct stones of Stensen's duct, no enlargement of submandibular glands, no tenderness of submandibular glands, no masses of submandibular glands, clear salivary flow from Marcio's ducts, no stones of [place]'s ducts    Temporomandibular Joint: no crepitus with motion, no tenderness on palpation, no trismus, motion symmetric    EYES:    Pupils: PERRLA, extra-ocular movements intact, no nystagmus, sclera white, no redness of eyes, no watering of eyes    EARS:    Bilateral External Ears: no pits, no tags    Right External Ear: normally formed, no lesions, no mastoid tenderness    Left External Ear: normally formed, no lesions, no mastoid tenderness    Right External Auditory Canal: normal, healthy skin, no obstructing cerumen, no discharge    Left External Auditory Canal: normal, healthy skin, no obstructing cerumen, no discharge    Right Tympanic Membrane: normal landmarks, translucent, mobile to pneumatic otoscopy, no perforation    Left Tympanic Membrane: normal landmarks, translucent, mobile to pneumatic otoscopy, no perforation    Hearing: intact to spoken voice, intact to finger rub    NECK:    Neck: no masses, trachea midline, normal range of motion, no cysts or pits, no tenderness to palpation    Thyroid: mildly enlarged thyroid, no tenderness, left nodule    LYMPH NODES:    Cervical: no palpable lymph node enlargement    RESPIRATORY:    Inspection/Auscultation: good air movement, chest expands symmetrically, normal breath sounds, no wheezing, no stridor    CARDIOVASCULAR SYSTEM:    Auscultation: regular rate and rhythm, carotid pulse normal, no carotid thrills, no carotid bruits    Observation/Palpation of Peripheral Vascular System: no varicosities, no cyanosis, no edema    SKIN:    General Appearance: no lesions, warm and dry, normal turgor, no bruising    NEUROLOGICAL SYSTEM:    Orientation: oriented to time, oriented to place, oriented to person    PSYCHIATRIC:    Mood and affect: normal mood, normal affect          Assessment and Plan:    She has had resolution of the submental adenopathy and this likely represent benign lymphoid enlargement. Serial ultrasound of the left thyroid nodules for change is advised with check in 6 months. Diagnosis Orders   1. Multiple thyroid nodules     2.  Enlarged lymph node Return in about 6 months (around 1/29/2021). The patient and/or caregiver is to notify the office if no improvement or worsening of symptoms is noted prior to the scheduled follow-up for sooner evaluation. The patient and/or caregiver is able to state an understanding of these recommendations and is agreeable to the treatment plan. --John Goetz MD on 7/29/2020 at 10:02 AM    An electronic signature was used to authenticate this note.

## 2021-01-18 ENCOUNTER — HOSPITAL ENCOUNTER (OUTPATIENT)
Dept: ULTRASOUND IMAGING | Age: 56
Discharge: HOME | End: 2021-01-20
Payer: COMMERCIAL

## 2021-01-18 DIAGNOSIS — E04.2 MULTIPLE THYROID NODULES: ICD-10-CM

## 2021-01-18 PROCEDURE — 76536 US EXAM OF HEAD AND NECK: CPT

## 2021-01-27 ENCOUNTER — OFFICE VISIT (OUTPATIENT)
Dept: ENT CLINIC | Age: 56
End: 2021-01-27
Payer: COMMERCIAL

## 2021-01-27 VITALS
TEMPERATURE: 97.3 F | BODY MASS INDEX: 31.15 KG/M2 | OXYGEN SATURATION: 98 % | DIASTOLIC BLOOD PRESSURE: 83 MMHG | SYSTOLIC BLOOD PRESSURE: 138 MMHG | HEIGHT: 61 IN | WEIGHT: 165 LBS | HEART RATE: 82 BPM

## 2021-01-27 DIAGNOSIS — E04.2 MULTIPLE THYROID NODULES: Primary | ICD-10-CM

## 2021-01-27 DIAGNOSIS — J02.9 SORE THROAT: ICD-10-CM

## 2021-01-27 PROCEDURE — 99213 OFFICE O/P EST LOW 20 MIN: CPT | Performed by: OTOLARYNGOLOGY

## 2021-01-27 RX ORDER — OMEPRAZOLE 20 MG/1
20 TABLET, DELAYED RELEASE ORAL DAILY
COMMUNITY
End: 2022-02-07

## 2021-01-27 ASSESSMENT — ENCOUNTER SYMPTOMS
CHOKING: 0
ANAL BLEEDING: 0
WHEEZING: 0
FACIAL SWELLING: 0
STRIDOR: 0
TROUBLE SWALLOWING: 0
EYE DISCHARGE: 0
APNEA: 0
COLOR CHANGE: 0
PHOTOPHOBIA: 0
RHINORRHEA: 0
SINUS PRESSURE: 0
ABDOMINAL DISTENTION: 0
DIARRHEA: 0
SHORTNESS OF BREATH: 0
EYE REDNESS: 0
VOMITING: 0
ABDOMINAL PAIN: 0
EYE PAIN: 0
SORE THROAT: 1
BLOOD IN STOOL: 0
BACK PAIN: 0
CONSTIPATION: 0
VOICE CHANGE: 0
COUGH: 0
CHEST TIGHTNESS: 0
EYE ITCHING: 0
NAUSEA: 0
RECTAL PAIN: 0
SINUS PAIN: 0

## 2021-01-27 NOTE — PROGRESS NOTES
[facility] [address], [facility]  [address] [address]  Dept: [phone]  MD Deena Basilio 54 y.o. female     Patient presents with a chief complaint of 6 Month Follow-Up and Thyroid Problem (Patient returns for follow-up from office visit 07/29/20. Thyroid US completed 01/18/21. Patient c/o irritation with the throat which started following the [address] on 01/18. She believes the symptoms mainly occur mainly in the mornings. )       /83   Pulse 82   Temp 97.3 °F (36.3 °C) (Infrared)   Ht 5' 1\" (1.549 m)   Wt 165 lb (74.8 kg)   LMP  (LMP Unknown)   SpO2 98%   Breastfeeding No   BMI 31.18 kg/m²       History of Presenting Illness: The patient/caregiver reports a history of complaint with the following features: Onset: some scratchy throat since ultrasound of thyroid  Timing: usually in the morning  Duration: brief  Quality: scratchy throat  Location: throat  Severity: pain mild  Risk factors: multinodular goiter  Alleviating factors: tea and honey  Aggravating factors: nothing makes it worse  Associated factors: no fever, cough, or loss of sense of smell    Review of systems covering 10 systems is reviewed as staff entry in other note and pertinent positives and negatives noted. Past Medical History:   Diagnosis Date    Allergic rhinitis     Asthma     DM type 2 (diabetes mellitus, type 2) ([facility] [address] 2012    Essential hypertension     H/O cardiovascular stress test 09/11/2019    Abnormal myocardial perfusion study. There is a small/moderate perfusino defect of mild/moderate intensity in the inferolateral, apical regions during stress imaging, which is most consistent with ischemia but may be due to artifact. In addition TID of the LV was seen. (TID 1.23).  The pt duke treadmill score is 0, which correlates with a intermediate risk for significant CAD.     H/O seasonal allergies     Mixed hyperlipidemia  Alcohol use: Yes     Comment: social    Drug use: No    Sexual activity: Not on file   Lifestyle    Physical activity     Days per week: Not on file     Minutes per session: Not on file    Stress: Not on file   Relationships    Social connections     Talks on phone: Not on file     Gets together: Not on file     Attends Synagogue service: Not on file     Active member of club or organization: Not on file     Attends meetings of clubs or organizations: Not on file     Relationship status: Not on file    Intimate partner violence     Fear of current or ex partner: Not on file     Emotionally abused: Not on file     Physically abused: Not on file     Forced sexual activity: Not on file   Other Topics Concern    Not on file   Social History Narrative    Not on file     Family History   Problem Relation Age of Onset    High Cholesterol Mother     Thyroid Disease Mother     Liver Disease Mother     High Blood Pressure Mother     High Blood Pressure Father     Heart Disease Father     Diabetes Brother     Brain Cancer Brother     Lung Cancer Brother     Ovarian Cancer Paternal Grandmother         PHYSICAL EXAM:     The patient was examined today 1/27/2021 with findings as follows:     CONSTITUTIONAL:     General Appearance: well-appearing, nontoxic, alert, no acute distress      Communication: understanding at normal conversational tones, normal voicing, speech intelligible     HEAD/FACE:     Head: atraumatic, normocephalic, no lesions     Facial Inspection: no lesions, healthy skin     Facial Strength: motor strength normal, symmetric strength, symmetric movement, motor strength     Sinuses: no sinus tenderness    Salivary Glands: no enlargements of parotid glands, no tenderness of parotid glands, no masses of parotid glands, clear salivary flow on palpation from Stensen's ducts, no duct stones of Stensen's duct, no enlargement of submandibular glands, no tenderness of submandibular glands, no masses of submandibular glands, clear salivary flow from Marcio's ducts, no stones of [place]'s ducts     Temporomandibular Joint: no crepitus with motion, no tenderness on palpation, no trismus, motion symmetric     EYES:     Pupils: PERRLA, extra-ocular movements intact, no nystagmus, sclera white, no redness of eyes, no watering of eyes     EARS:     Bilateral External Ears: no pits, no tags     Right External Ear: normally formed, no lesions, no mastoid tenderness     Left External Ear: normally formed, no lesions, no mastoid tenderness     Right External Auditory Canal: normal, healthy skin, no obstructing cerumen, no discharge     Left External Auditory Canal: normal, healthy skin, no obstructing cerumen, no discharge     Right Tympanic Membrane: normal landmarks, translucent, mobile to pneumatic otoscopy, no perforation     Left Tympanic Membrane: normal landmarks, translucent, mobile to pneumatic otoscopy, no perforation     Hearing: intact to spoken voice, intact to finger rub     NECK:     Neck: no masses, trachea midline, normal range of motion, no cysts or pits, no tenderness to palpation     Thyroid: mildly enlarged thyroid, no tenderness, left nodule     LYMPH NODES:     Cervical: no palpable lymph node enlargement     RESPIRATORY:     Inspection/Auscultation: good air movement, chest expands symmetrically, normal breath sounds, no wheezing, no stridor     CARDIOVASCULAR SYSTEM:     Auscultation: regular rate and rhythm, carotid pulse normal, no carotid thrills, no carotid bruits     Observation/Palpation of Peripheral Vascular System: no varicosities, no cyanosis, no edema     SKIN:    General Appearance: no lesions, warm and dry, normal turgor, no bruising     NEUROLOGICAL SYSTEM:     Orientation: oriented to time, oriented to place, oriented to person     PSYCHIATRIC:     Mood and affect: normal mood, normal affect    Assessment and Plan:    She presents for follow-up of her thryoid nodules and submental lymph node. No interval change is noted in these findings on her recent ultrasound which is suggestive of benign processes and reassurance is offered. In the absence of any clinical changes, repeat ultrasound in 3 years is advised. I see no signs of pharyngitis and some morning throat irritation with the winter dry air is no uncommon and reassurance is offered. Diagnosis Orders   1. Multiple thyroid nodules  US THYROID   2. Sore throat        Return in about 3 years (around 1/27/2024). The patient and/or caregiver is to notify the office if no improvement or worsening of symptoms is noted prior to the scheduled follow-up for sooner evaluation. The patient and/or caregiver is able to state an understanding of these recommendations and is agreeable to the treatment plan. --Tee Portillo MD on 1/27/2021 at 9:28 AM    An electronic signature was used to authenticate this note.

## 2021-01-27 NOTE — PROGRESS NOTES
Review of Systems   Constitutional: Negative for activity change, appetite change, chills, diaphoresis, fatigue, fever and unexpected weight change. HENT: Positive for sore throat. Negative for congestion, dental problem, drooling, ear discharge, ear pain, facial swelling, hearing loss, mouth sores, nosebleeds, postnasal drip, rhinorrhea, sinus pressure, sinus pain, sneezing, tinnitus, trouble swallowing and voice change. Eyes: Negative for photophobia, pain, discharge, redness, itching and visual disturbance. Respiratory: Negative for apnea, cough, choking, chest tightness, shortness of breath, wheezing and stridor. Cardiovascular: Negative for chest pain, palpitations and leg swelling. Gastrointestinal: Negative for abdominal distention, abdominal pain, anal bleeding, blood in stool, constipation, diarrhea, nausea, rectal pain and vomiting. Endocrine: Negative for cold intolerance, heat intolerance, polydipsia, polyphagia and polyuria. Genitourinary: Negative for decreased urine volume, difficulty urinating, dyspareunia, dysuria, enuresis, flank pain, frequency, genital sores, hematuria, menstrual problem, pelvic pain, urgency, vaginal bleeding, vaginal discharge and vaginal pain. Musculoskeletal: Negative for arthralgias, back pain, gait problem, joint swelling, myalgias, neck pain and neck stiffness. Skin: Negative for color change, pallor, rash and wound. Allergic/Immunologic: Positive for environmental allergies (chemical allergies). Negative for food allergies and immunocompromised state. Neurological: Negative for dizziness, tremors, seizures, syncope, facial asymmetry, speech difficulty, weakness, light-headedness, numbness and headaches. Hematological: Negative for adenopathy. Does not bruise/bleed easily. Psychiatric/Behavioral: Negative for agitation, behavioral problems, confusion, decreased concentration, dysphoric mood, hallucinations, self-injury, sleep disturbance and suicidal ideas. The patient is not nervous/anxious and is not hyperactive.

## 2021-01-27 NOTE — PATIENT INSTRUCTIONS
SURVEY:    You may be receiving a survey from [employer] regarding your visit today. Please complete the survey to enable us to provide the highest quality of care to you and your family. If you cannot score us a very good on any question, please call the office to discuss how we could have made your experience a very good one. Thank you.

## 2021-02-10 ENCOUNTER — HOSPITAL ENCOUNTER (OUTPATIENT)
Age: 56
Discharge: HOME | End: 2021-02-10
Payer: COMMERCIAL

## 2021-02-10 DIAGNOSIS — E11.9 TYPE 2 DIABETES MELLITUS WITHOUT COMPLICATION, WITHOUT LONG-TERM CURRENT USE OF INSULIN (HCC): ICD-10-CM

## 2021-02-10 LAB
ESTIMATED AVERAGE GLUCOSE: 183 MG/DL
HBA1C MFR BLD: 8 % (ref 4–6)

## 2021-02-10 PROCEDURE — 36415 COLL VENOUS BLD VENIPUNCTURE: CPT

## 2021-02-10 PROCEDURE — 83036 HEMOGLOBIN GLYCOSYLATED A1C: CPT

## 2021-03-10 ENCOUNTER — HOSPITAL ENCOUNTER (OUTPATIENT)
Dept: WOMENS IMAGING | Age: 56
Discharge: HOME | End: 2021-03-12
Payer: COMMERCIAL

## 2021-03-10 DIAGNOSIS — Z12.31 BREAST CANCER SCREENING BY MAMMOGRAM: ICD-10-CM

## 2021-03-10 PROBLEM — M65.312 TRIGGER THUMB OF LEFT HAND: Status: ACTIVE | Noted: 2021-03-10

## 2021-03-10 PROCEDURE — 77063 BREAST TOMOSYNTHESIS BI: CPT

## 2021-05-21 ENCOUNTER — HOSPITAL ENCOUNTER (OUTPATIENT)
Dept: NUTRITION | Age: 56
Discharge: HOME | End: 2021-05-21
Payer: COMMERCIAL

## 2021-05-21 VITALS — HEIGHT: 61 IN | BODY MASS INDEX: 30.8 KG/M2 | WEIGHT: 163.13 LBS

## 2021-05-21 PROCEDURE — 97802 MEDICAL NUTRITION INDIV IN: CPT

## 2021-05-21 NOTE — PROGRESS NOTES
MNT provided for Diabetes Mellitus    Altered nutrition-related lab values: A1c related to Organ/system dysfunction: endocrine as evidenced by Lab values:   Lab Results   Component Value Date    LABA1C 8.0 02/10/2021       Client data    Ht: 5'1\" Wt: 163# 2 oz BMI: 30.82 (obesity class I)   Weight changes: stable CBW: 74 kg   BMR: 1277 calories Est. total calorie needs: ~6419-3757       Client overall goal for weight is for weight loss trend to achieve healthier BMI. Current eating pattern is with errors in meal timing, typically no breakfast, sometimes skips dinner too. Use of whole grains, whole fruits and vegetables appear less than recommended quantities (tomatoes were reported on tomato sandwich at lunch, no fruit noted, low whole grains). There is an excess of calories, carbohydrates, and fats ([employer] or muffin, sweet tea Hx, candy bars, ice cream, junk food) per recall. Activity is fair, walks every night now. Reports Hx higher stress level. Client presents for MNT today with self. Client was educated on carbohydrate counting with the following goals at meals and snacks:  Breakfast: 45 grams  Lunch: 45 grams  Supper: 45 grams  Bedtime Snack: 15 grams    Client received information on limiting fat in diet to lessen heart disease risk, with goals of:   Total Fat: 42 grams  Saturated Fat: 8 grams  Trans Fat: 0 grams daily     Discussed and provided literature on:    Reading food labels, carbohydrate counting, importance of consistency of meal timing (eating meals every 4.5-5 hours), importance of carbohydrate consistency (carbohydrate recommendations as noted above), importance of physical activity with a minimum goal of 30 minutes 5 days per week, healthy snack options (fruit, vegetables, lite popcorn, wheat thins, etc.), plate method (half of 9\" plate with non starchy vegetables such as broccoli or cauliflower, with protein item such as chicken and ,starch option such as a potato sharing a quarter of the plate each),  importance of consuming protein and carbohydrate foods together (for meal and snack satiety), and cooking methods (baking broiling, grilling) were discussed with Louis Xavier acknowledged understanding and demonstrated good recall. She is interested in making changes and improving glycemic control. Maria A received the following diet instruction materials:  Choose Your Foods, Diabetes Food Label, Carbohydrate Counting for People With Diabetes, Between [employer], Choose MyPlate, 7386 calorie meal plan for 7 days, and a refrigerator paper. Client goals:    1. Eat breakfast or have something such as toast with peanut butter within 1 hour of rising every day. 2. Increase fruits/vegetables, and whole grains in daily diet. 3. Reduce sources of added sugars limiting them to < 25 grams per day. 4. Include 30 minutes a minimum of 5 days per week physical activity. Expected compliance:  Good, Maria A is motivated to make diet and lifestyle changes, states stress has improved. Client appears to be in a contemplative phase of change. Recommend follow up as needed. RD name and phone number provided. Thank you for the referral.    Electronically signed by Kate Montero RD, LD on 5/21/2021 at 5:20 PM    Education session duration: 60 minutes.

## 2021-08-23 ENCOUNTER — HOSPITAL ENCOUNTER (OUTPATIENT)
Age: 56
Discharge: HOME | End: 2021-08-23
Payer: COMMERCIAL

## 2021-08-23 DIAGNOSIS — E11.9 TYPE 2 DIABETES MELLITUS WITHOUT COMPLICATION, WITHOUT LONG-TERM CURRENT USE OF INSULIN (HCC): ICD-10-CM

## 2021-08-23 LAB
ESTIMATED AVERAGE GLUCOSE: 183 MG/DL
HBA1C MFR BLD: 8 % (ref 4–6)

## 2021-08-23 PROCEDURE — 83036 HEMOGLOBIN GLYCOSYLATED A1C: CPT

## 2021-08-23 PROCEDURE — 36415 COLL VENOUS BLD VENIPUNCTURE: CPT

## 2021-12-15 ENCOUNTER — HOSPITAL ENCOUNTER (OUTPATIENT)
Age: 56
Discharge: HOME | End: 2021-12-15
Payer: COMMERCIAL

## 2021-12-15 DIAGNOSIS — Z00.00 WELLNESS EXAMINATION: ICD-10-CM

## 2021-12-15 LAB
ANION GAP SERPL CALCULATED.3IONS-SCNC: 11 MMOL/L (ref 9–17)
BUN BLDV-MCNC: 17 MG/DL (ref 6–20)
BUN/CREAT BLD: 23 (ref 9–20)
CALCIUM SERPL-MCNC: 9.7 MG/DL (ref 8.6–10.4)
CHLORIDE BLD-SCNC: 103 MMOL/L (ref 98–107)
CHOLESTEROL, FASTING: 176 MG/DL
CHOLESTEROL/HDL RATIO: 4
CO2: 26 MMOL/L (ref 20–31)
CREAT SERPL-MCNC: 0.74 MG/DL (ref 0.5–0.9)
CREATININE URINE: 118.4 MG/DL (ref 28–217)
ESTIMATED AVERAGE GLUCOSE: 192 MG/DL
GFR AFRICAN AMERICAN: >60 ML/MIN
GFR NON-AFRICAN AMERICAN: >60 ML/MIN
GFR SERPL CREATININE-BSD FRML MDRD: ABNORMAL ML/MIN/{1.73_M2}
GFR SERPL CREATININE-BSD FRML MDRD: ABNORMAL ML/MIN/{1.73_M2}
GLUCOSE FASTING: 155 MG/DL (ref 70–99)
HBA1C MFR BLD: 8.3 % (ref 4–6)
HDLC SERPL-MCNC: 44 MG/DL
LDL CHOLESTEROL: 103 MG/DL (ref 0–130)
MICROALBUMIN/CREAT 24H UR: <12 MG/L
MICROALBUMIN/CREAT UR-RTO: NORMAL MCG/MG CREAT
POTASSIUM SERPL-SCNC: 4.3 MMOL/L (ref 3.7–5.3)
SODIUM BLD-SCNC: 140 MMOL/L (ref 135–144)
TRIGLYCERIDE, FASTING: 143 MG/DL
VLDLC SERPL CALC-MCNC: NORMAL MG/DL (ref 1–30)

## 2021-12-15 PROCEDURE — 82570 ASSAY OF URINE CREATININE: CPT

## 2021-12-15 PROCEDURE — 83036 HEMOGLOBIN GLYCOSYLATED A1C: CPT

## 2021-12-15 PROCEDURE — 80048 BASIC METABOLIC PNL TOTAL CA: CPT

## 2021-12-15 PROCEDURE — 36415 COLL VENOUS BLD VENIPUNCTURE: CPT

## 2021-12-15 PROCEDURE — 80061 LIPID PANEL: CPT

## 2021-12-15 PROCEDURE — 82043 UR ALBUMIN QUANTITATIVE: CPT

## 2022-02-04 ENCOUNTER — APPOINTMENT (OUTPATIENT)
Dept: GENERAL RADIOLOGY | Age: 57
End: 2022-02-04
Payer: COMMERCIAL

## 2022-02-04 ENCOUNTER — HOSPITAL ENCOUNTER (EMERGENCY)
Age: 57
Discharge: HOME | End: 2022-02-04
Attending: EMERGENCY MEDICINE
Payer: COMMERCIAL

## 2022-02-04 VITALS
RESPIRATION RATE: 18 BRPM | DIASTOLIC BLOOD PRESSURE: 79 MMHG | WEIGHT: 163 LBS | HEIGHT: 61 IN | OXYGEN SATURATION: 97 % | TEMPERATURE: 97.5 F | SYSTOLIC BLOOD PRESSURE: 177 MMHG | HEART RATE: 105 BPM | BODY MASS INDEX: 30.78 KG/M2

## 2022-02-04 DIAGNOSIS — R11.2 NAUSEA AND VOMITING, INTRACTABILITY OF VOMITING NOT SPECIFIED, UNSPECIFIED VOMITING TYPE: Primary | ICD-10-CM

## 2022-02-04 LAB
ABSOLUTE EOS #: <0.03 K/UL (ref 0–0.44)
ABSOLUTE IMMATURE GRANULOCYTE: 0.03 K/UL (ref 0–0.3)
ABSOLUTE LYMPH #: 1.05 K/UL (ref 1.1–3.7)
ABSOLUTE MONO #: 0.61 K/UL (ref 0.1–1.2)
ALBUMIN SERPL-MCNC: 4.6 G/DL (ref 3.5–5.2)
ALBUMIN/GLOBULIN RATIO: 1.5 (ref 1–2.5)
ALP BLD-CCNC: 130 U/L (ref 35–104)
ALT SERPL-CCNC: 23 U/L (ref 5–33)
ANION GAP SERPL CALCULATED.3IONS-SCNC: 17 MMOL/L (ref 9–17)
AST SERPL-CCNC: 17 U/L
BASOPHILS # BLD: 0 % (ref 0–2)
BASOPHILS ABSOLUTE: <0.03 K/UL (ref 0–0.2)
BILIRUB SERPL-MCNC: 0.66 MG/DL (ref 0.3–1.2)
BUN BLDV-MCNC: 14 MG/DL (ref 6–20)
BUN/CREAT BLD: 20 (ref 9–20)
CALCIUM SERPL-MCNC: 10.4 MG/DL (ref 8.6–10.4)
CHLORIDE BLD-SCNC: 96 MMOL/L (ref 98–107)
CO2: 22 MMOL/L (ref 20–31)
CREAT SERPL-MCNC: 0.69 MG/DL (ref 0.5–0.9)
D-DIMER QUANTITATIVE: <0.27 MG/L FEU (ref 0–0.59)
DIFFERENTIAL TYPE: ABNORMAL
EKG ATRIAL RATE: 102 BPM
EKG P AXIS: 54 DEGREES
EKG P-R INTERVAL: 188 MS
EKG Q-T INTERVAL: 348 MS
EKG QRS DURATION: 96 MS
EKG QTC CALCULATION (BAZETT): 453 MS
EKG R AXIS: 10 DEGREES
EKG T AXIS: 47 DEGREES
EKG VENTRICULAR RATE: 102 BPM
EOSINOPHILS RELATIVE PERCENT: 0 % (ref 1–4)
GFR AFRICAN AMERICAN: >60 ML/MIN
GFR NON-AFRICAN AMERICAN: >60 ML/MIN
GFR SERPL CREATININE-BSD FRML MDRD: ABNORMAL ML/MIN/{1.73_M2}
GFR SERPL CREATININE-BSD FRML MDRD: ABNORMAL ML/MIN/{1.73_M2}
GLUCOSE BLD-MCNC: 287 MG/DL (ref 70–99)
HCT VFR BLD CALC: 44.3 % (ref 36.3–47.1)
HEMOGLOBIN: 14.9 G/DL (ref 11.9–15.1)
IMMATURE GRANULOCYTES: 0 %
LYMPHOCYTES # BLD: 8 % (ref 24–43)
MCH RBC QN AUTO: 30.1 PG (ref 25.2–33.5)
MCHC RBC AUTO-ENTMCNC: 33.6 G/DL (ref 28.4–34.8)
MCV RBC AUTO: 89.5 FL (ref 82.6–102.9)
MONOCYTES # BLD: 5 % (ref 3–12)
NRBC AUTOMATED: 0 PER 100 WBC
PDW BLD-RTO: 12.2 % (ref 11.8–14.4)
PLATELET # BLD: 202 K/UL (ref 138–453)
PLATELET ESTIMATE: ABNORMAL
PMV BLD AUTO: 10 FL (ref 8.1–13.5)
POTASSIUM SERPL-SCNC: 3.7 MMOL/L (ref 3.7–5.3)
RBC # BLD: 4.95 M/UL (ref 3.95–5.11)
RBC # BLD: ABNORMAL 10*6/UL
SEG NEUTROPHILS: 87 % (ref 36–65)
SEGMENTED NEUTROPHILS ABSOLUTE COUNT: 11.35 K/UL (ref 1.5–8.1)
SODIUM BLD-SCNC: 135 MMOL/L (ref 135–144)
TOTAL PROTEIN: 7.6 G/DL (ref 6.4–8.3)
TROPONIN INTERP: NORMAL
TROPONIN T: NORMAL NG/ML
TROPONIN, HIGH SENSITIVITY: <6 NG/L (ref 0–14)
WBC # BLD: 13.1 K/UL (ref 3.5–11.3)
WBC # BLD: ABNORMAL 10*3/UL

## 2022-02-04 PROCEDURE — 80053 COMPREHEN METABOLIC PANEL: CPT

## 2022-02-04 PROCEDURE — 84484 ASSAY OF TROPONIN QUANT: CPT

## 2022-02-04 PROCEDURE — 2580000003 HC RX 258: Performed by: EMERGENCY MEDICINE

## 2022-02-04 PROCEDURE — 2500000003 HC RX 250 WO HCPCS: Performed by: EMERGENCY MEDICINE

## 2022-02-04 PROCEDURE — 6370000000 HC RX 637 (ALT 250 FOR IP): Performed by: EMERGENCY MEDICINE

## 2022-02-04 PROCEDURE — 96374 THER/PROPH/DIAG INJ IV PUSH: CPT

## 2022-02-04 PROCEDURE — 93010 ELECTROCARDIOGRAM REPORT: CPT | Performed by: FAMILY MEDICINE

## 2022-02-04 PROCEDURE — 96375 TX/PRO/DX INJ NEW DRUG ADDON: CPT

## 2022-02-04 PROCEDURE — 99284 EMERGENCY DEPT VISIT MOD MDM: CPT

## 2022-02-04 PROCEDURE — 93005 ELECTROCARDIOGRAM TRACING: CPT | Performed by: EMERGENCY MEDICINE

## 2022-02-04 PROCEDURE — 85379 FIBRIN DEGRADATION QUANT: CPT

## 2022-02-04 PROCEDURE — 36415 COLL VENOUS BLD VENIPUNCTURE: CPT

## 2022-02-04 PROCEDURE — 85025 COMPLETE CBC W/AUTO DIFF WBC: CPT

## 2022-02-04 PROCEDURE — 6360000002 HC RX W HCPCS: Performed by: EMERGENCY MEDICINE

## 2022-02-04 PROCEDURE — 71045 X-RAY EXAM CHEST 1 VIEW: CPT

## 2022-02-04 RX ORDER — LIDOCAINE HYDROCHLORIDE 20 MG/ML
15 SOLUTION OROPHARYNGEAL
Status: DISCONTINUED | OUTPATIENT
Start: 2022-02-04 | End: 2022-02-04 | Stop reason: HOSPADM

## 2022-02-04 RX ORDER — ONDANSETRON 4 MG/1
4 TABLET, ORALLY DISINTEGRATING ORAL EVERY 8 HOURS PRN
Qty: 12 TABLET | Refills: 0 | Status: SHIPPED | OUTPATIENT
Start: 2022-02-04 | End: 2022-08-15

## 2022-02-04 RX ORDER — 0.9 % SODIUM CHLORIDE 0.9 %
1000 INTRAVENOUS SOLUTION INTRAVENOUS ONCE
Status: COMPLETED | OUTPATIENT
Start: 2022-02-04 | End: 2022-02-04

## 2022-02-04 RX ORDER — ONDANSETRON 2 MG/ML
4 INJECTION INTRAMUSCULAR; INTRAVENOUS ONCE
Status: COMPLETED | OUTPATIENT
Start: 2022-02-04 | End: 2022-02-04

## 2022-02-04 RX ORDER — MAGNESIUM HYDROXIDE/ALUMINUM HYDROXICE/SIMETHICONE 120; 1200; 1200 MG/30ML; MG/30ML; MG/30ML
30 SUSPENSION ORAL ONCE
Status: COMPLETED | OUTPATIENT
Start: 2022-02-04 | End: 2022-02-04

## 2022-02-04 RX ADMIN — FAMOTIDINE 20 MG: 10 INJECTION, SOLUTION INTRAVENOUS at 09:38

## 2022-02-04 RX ADMIN — ONDANSETRON 4 MG: 2 INJECTION INTRAMUSCULAR; INTRAVENOUS at 10:01

## 2022-02-04 RX ADMIN — SODIUM CHLORIDE 999 ML: 9 INJECTION, SOLUTION INTRAVENOUS at 09:37

## 2022-02-04 RX ADMIN — ALUMINUM HYDROXIDE, MAGNESIUM HYDROXIDE, AND SIMETHICONE 30 ML: 200; 200; 20 SUSPENSION ORAL at 09:38

## 2022-02-04 RX ADMIN — LIDOCAINE HYDROCHLORIDE 15 ML: 20 SOLUTION ORAL; TOPICAL at 09:38

## 2022-02-04 ASSESSMENT — PAIN DESCRIPTION - LOCATION: LOCATION: ABDOMEN

## 2022-02-04 ASSESSMENT — PAIN DESCRIPTION - ORIENTATION: ORIENTATION: MID;UPPER

## 2022-02-04 ASSESSMENT — PAIN DESCRIPTION - DESCRIPTORS: DESCRIPTORS: ACHING

## 2022-02-04 ASSESSMENT — PAIN SCALES - GENERAL: PAINLEVEL_OUTOF10: 6

## 2022-02-04 ASSESSMENT — PAIN DESCRIPTION - PAIN TYPE: TYPE: ACUTE PAIN

## 2022-02-04 NOTE — ED PROVIDER NOTES
Shanika.  EMERGENCY DEPARTMENT ENCOUNTER   CHIEF COMPLAINT   Chief Complaint   Patient presents with    Emesis     Onset last PM, several episodes    Gastroesophageal Reflux     Onset yesterday AM, history of GERD. Pt reports no longer taking Prilosec      HPI   Juan C Lee is a 64 y.o. female who presents with Vomiting, onset was yesterday. The duration has been constant since the onset. She threw up about 5 times. No blood. Some epigastric / chest pain. The patient has associated no complaints. There are no alleviating factors. The context is that the symptoms started spontaneously, without any known precipitants. They have not had this lately. REVIEW OF SYSTEMS   GI: Patient complains of vomiting  Cardiac: No chest pain or syncope  Pulmonary: No difficulty breathing or new cough  General: No fevers  : No hematuria or dysuria  See HPI for further details. All other review of systems otherwise negative. PAST MEDICAL & SURGICAL HISTORY   Past Medical History:   Diagnosis Date    Allergic rhinitis     Asthma     DM type 2 (diabetes mellitus, type 2) ([facility] [address] 2012    Essential hypertension     H/O cardiovascular stress test 2019    Abnormal myocardial perfusion study. There is a small/moderate perfusino defect of mild/moderate intensity in the inferolateral, apical regions during stress imaging, which is most consistent with ischemia but may be due to artifact. In addition TID of the LV was seen. (TID 1.23).  The pt duke treadmill score is 0, which correlates with a intermediate risk for significant CAD.     H/O seasonal allergies     Mixed hyperlipidemia     MVP (mitral valve prolapse)      Past Surgical History:   Procedure Laterality Date    BONE CYST EXCISION      tailbone    CARDIAC CATHETERIZATION  10/25/2019     SECTION      COLONOSCOPY  1996    colon polyps    WISDOM TOOTH EXTRACTION        CURRENT MEDICATIONS   Current Outpatient Rx   Medication Sig Dispense Refill    ondansetron (ZOFRAN ODT) 4 MG disintegrating tablet Take 1 tablet by mouth every 8 hours as needed for Nausea or Vomiting 12 tablet 0    glimepiride (AMARYL) 4 MG tablet Take 1 tablet by mouth 2 times daily 60 tablet 11    metFORMIN (GLUCOPHAGE-XR) 500 MG extended release tablet Take 1 tablet by mouth daily (with breakfast) 90 tablet 3    bisoprolol-hydroCHLOROthiazide (ZIAC) 5-6.25 MG per tablet Take 1 tablet by mouth daily 30 tablet 11    omeprazole (PRILOSEC OTC) 20 MG tablet Take 20 mg by mouth daily      Lancets 30G MISC 1 each by Does not apply route daily 100 each 3    blood glucose test strips (FREESTYLE LITE) strip TEST ONE TIMES A DAY & AS NEEDED FOR SYMPTOMS OF IRREGULAR BLOOD GLUCOSE. 50 strip 3    Blood Glucose Monitoring Suppl (FREESTYLE LITE) FIGUEROA USE DAILY WITH TEST STRIPS AND LANCETS  0    glucose monitoring kit (FREESTYLE) monitoring kit 1 kit by Does not apply route daily With test strips and supplies /lancets 1 kit 0    cetirizine (ZYRTEC) 10 MG tablet Take 10 mg by mouth daily.         ALLERGIES   Allergies   Allergen Reactions    Pcn [Penicillins] Rash      SOCIAL AND FAMILY HISTORY   Social History     Socioeconomic History    Marital status:      Spouse name: None    Number of children: None    Years of education: None    Highest education level: None   Occupational History    None   Tobacco Use    Smoking status: Never Smoker    Smokeless tobacco: Never Used   Vaping Use    Vaping Use: Never used   Substance and Sexual Activity    Alcohol use: Yes     Comment: social    Drug use: No    Sexual activity: None   Other Topics Concern    None   Social History Narrative    None     Social Determinants of Health     Financial Resource Strain: Low Risk     Difficulty of Paying Living Expenses: Not hard at all   Food Insecurity: No Food Insecurity    Worried About Running Out of Food in the Last Year: Never true    Viktoriya of Food in the Last Year: Never true Transportation Needs:     Lack of Transportation (Medical): Not on file    Lack of Transportation (Non-Medical): Not on file   Physical Activity:     Days of Exercise per Week: Not on file    Minutes of Exercise per Session: Not on file   Stress:     Feeling of Stress : Not on file   Social Connections:     Frequency of Communication with Friends and Family: Not on file    Frequency of Social Gatherings with Friends and Family: Not on file    Attends Congregation Services: Not on file    Active Member of [address] [place] [address] [employer] or Organizations: Not on file    Attends Club or Organization Meetings: Not on file    Marital Status: Not on file   Intimate Partner Violence:     Fear of Current or Ex-Partner: Not on file    Emotionally Abused: Not on file    Physically Abused: Not on file    Sexually Abused: Not on file   Housing Stability:     Unable to Pay for Housing in the Last Year: Not on file    Number of [place] in the Last Year: Not on file    Unstable Housing in the Last Year: Not on file     Family History   Problem Relation Age of Onset    High Cholesterol Mother     Thyroid Disease Mother     Liver Disease Mother     High Blood Pressure Mother     High Blood Pressure Father     Heart Disease Father     Diabetes Brother     Brain Cancer Brother     Lung Cancer Brother     Ovarian Cancer Paternal Grandmother         PHYSICAL EXAM   VITAL SIGNS: BP (!) 177/79   Pulse 105   Temp 97.5 °F (36.4 °C) (Tympanic)   Resp 18   Ht 5' 1\" (1.549 m)   Wt 163 lb (73.9 kg)   LMP  (LMP Unknown)   SpO2 97%   BMI 30.80 kg/m²   Constitutional: Well developed, well nourished  Eyes: Sclera nonicteric, conjunctiva moist  HENT: Atraumatic, nose normal  Neck: Supple, no JVD  Respiratory: No retractions, normal breath sounds   Cardiovascular: Normal rate, normal rhythm, no murmurs  GI: Soft, no abdominal tenderness, no guarding, bowel sounds present, no audible bruits or palpable pulsatile masses.   Musculoskeletal: No edema, no deformity   Vascular: radial pulses 2+ equal bilaterally  Integument: No rash, dry skin  Neurologic: Alert & oriented, normal speech  Psychiatric: Cooperative, pleasant affect   RADIOLOGY/PROCEDURES   XR CHEST PORTABLE   Final Result   Mild bilateral linear atelectasis. EKG shows sinus tachycardia rate 102 no STEMI. ED COURSE & MEDICAL DECISION MAKING   Pertinent Labs & Imaging studies reviewed and interpreted. (See chart for details)   See EMR for medications prescribed  Vitals:    02/04/22 0839   BP: (!) 177/79   Pulse: 105   Resp: 18   Temp: 97.5 °F (36.4 °C)   SpO2: 97%     Differential diagnosis: enteritis, bowel obstruction, ileus, MI, dissection    MDM: Patient well-appearing. She was given treatment for vomiting and she seemed to improve. She saw some nausea and was given some Zofran. Troponin was totally negative. EKG looks good as well. She will go home and follow-up with her doctor as needed or return here if she feels worse. States the patient was slightly tachycardic and D-dimer was negative. FINAL IMPRESSION   1.  Nausea and vomiting, intractability of vomiting not specified, unspecified vomiting type        PLAN  Home with follow up  Electronically signed by: Tigist Phipps MD, 2/4/2022 11:02 AM  (This note was completed with a voice recognition program)            Tigist Phipps MD  02/04/22 [id number]

## 2022-07-20 ENCOUNTER — HOSPITAL ENCOUNTER (OUTPATIENT)
Age: 57
Discharge: HOME | End: 2022-07-20
Payer: COMMERCIAL

## 2022-07-20 ENCOUNTER — HOSPITAL ENCOUNTER (OUTPATIENT)
Dept: ULTRASOUND IMAGING | Age: 57
Discharge: HOME | End: 2022-07-22
Payer: COMMERCIAL

## 2022-07-20 DIAGNOSIS — E11.9 TYPE 2 DIABETES MELLITUS WITHOUT COMPLICATION, WITHOUT LONG-TERM CURRENT USE OF INSULIN (HCC): ICD-10-CM

## 2022-07-20 DIAGNOSIS — R22.1 NECK SWELLING: ICD-10-CM

## 2022-07-20 DIAGNOSIS — E04.1 THYROID NODULE: ICD-10-CM

## 2022-07-20 PROCEDURE — 76536 US EXAM OF HEAD AND NECK: CPT

## 2022-07-20 PROCEDURE — 83036 HEMOGLOBIN GLYCOSYLATED A1C: CPT

## 2022-07-20 PROCEDURE — 36415 COLL VENOUS BLD VENIPUNCTURE: CPT

## 2022-07-21 LAB
ESTIMATED AVERAGE GLUCOSE: 163 MG/DL
HBA1C MFR BLD: 7.3 % (ref 4–6)

## 2022-08-01 ENCOUNTER — OFFICE VISIT (OUTPATIENT)
Dept: ENT CLINIC | Age: 57
End: 2022-08-01
Payer: COMMERCIAL

## 2022-08-01 VITALS
WEIGHT: 154 LBS | RESPIRATION RATE: 18 BRPM | HEART RATE: 78 BPM | DIASTOLIC BLOOD PRESSURE: 78 MMHG | SYSTOLIC BLOOD PRESSURE: 116 MMHG | BODY MASS INDEX: 29.1 KG/M2 | OXYGEN SATURATION: 97 %

## 2022-08-01 DIAGNOSIS — K21.9 LARYNGOPHARYNGEAL REFLUX (LPR): ICD-10-CM

## 2022-08-01 DIAGNOSIS — R59.9 ENLARGEMENT OF LYMPH NODE: Primary | ICD-10-CM

## 2022-08-01 DIAGNOSIS — E04.2 MULTIPLE THYROID NODULES: ICD-10-CM

## 2022-08-01 PROCEDURE — 99214 OFFICE O/P EST MOD 30 MIN: CPT | Performed by: OTOLARYNGOLOGY

## 2022-08-01 ASSESSMENT — ENCOUNTER SYMPTOMS
SORE THROAT: 0
EYES NEGATIVE: 1
RHINORRHEA: 0
RESPIRATORY NEGATIVE: 1
VOICE CHANGE: 1
ALLERGIC/IMMUNOLOGIC NEGATIVE: 1
SINUS PAIN: 0
SINUS PRESSURE: 0
TROUBLE SWALLOWING: 0
GASTROINTESTINAL NEGATIVE: 1
FACIAL SWELLING: 0

## 2022-08-01 NOTE — PROGRESS NOTES
Review of Systems   Constitutional: Negative. HENT:  Positive for voice change. Negative for congestion, dental problem, drooling, ear discharge, ear pain, facial swelling, hearing loss, mouth sores, nosebleeds, postnasal drip, rhinorrhea, sinus pressure, sinus pain, sneezing, sore throat, tinnitus and trouble swallowing. Eyes: Negative. Respiratory: Negative. Cardiovascular: Negative. Gastrointestinal: Negative. Endocrine: Negative. Genitourinary: Negative. Musculoskeletal: Negative. Skin: Negative. Allergic/Immunologic: Negative. Neurological: Negative. Hematological:  Positive for adenopathy. Psychiatric/Behavioral: Negative.

## 2022-08-01 NOTE — PROGRESS NOTES
[facility]  [address] [facility] [address]  Dept: [phone]  MD Alice Brown 64 y.o. female     Patient presents with a chief complaint of Adenopathy (C/o swollen lymph nodes in the neck/throat) and Hoarse (C/o hoarse voice since having a nightmare and screaming in her sleep)       /78   Pulse 78   Resp 18   Wt 154 lb (69.9 kg)   LMP  (LMP Unknown)   SpO2 97%   BMI 29.10 kg/m²       History of Presenting Illness: The patient/caregiver reports a history of complaint with the following features: Onset: noted palpable lymph node in submental area over last few weeks  Timing: new onset  Duration: 3 weeks  Quality: palpable lump in submental area  Location: under chin  Severity: pain none  Risk factors: recent woke screaming from dream, has has some throat irritation since  Alleviating factors: nothing gives relief  Aggravating factors: nothing makes it worse  Associated factors: recent follow-up ultrasound of thyroid    Review of systems covering 10 systems is reviewed as staff entry in other note and pertinent positives and negatives noted. Past Medical History:   Diagnosis Date    Allergic rhinitis     Asthma     COVID-19 virus infection /  Lan Scrape vaccine 2022    DM type 2 (diabetes mellitus, type 2) ([facility] [address] 2012    Essential hypertension     H/O cardiovascular stress test 09/11/2019    Abnormal myocardial perfusion study. There is a small/moderate perfusino defect of mild/moderate intensity in the inferolateral, apical regions during stress imaging, which is most consistent with ischemia but may be due to artifact. In addition TID of the LV was seen. (TID 1.23). The pt duke treadmill score is 0, which correlates with a intermediate risk for significant CAD.      H/O seasonal allergies     Mixed hyperlipidemia     MVP (mitral valve prolapse)        Current Outpatient Medications:     metFORMIN (GLUCOPHAGE-XR) 500 MG extended release tablet, Take 1 tablet by mouth daily (with breakfast), Disp: 90 tablet, Rfl: 3    glimepiride (AMARYL) 4 MG tablet, Take 1 tablet by mouth 2 times daily, Disp: 180 tablet, Rfl: 3    bisoprolol-hydroCHLOROthiazide (ZIAC) 5-6.25 MG per tablet, Take 1 tablet by mouth daily, Disp: 90 tablet, Rfl: 3    ondansetron (ZOFRAN ODT) 4 MG disintegrating tablet, Take 1 tablet by mouth every 8 hours as needed for Nausea or Vomiting, Disp: 12 tablet, Rfl: 0    Lancets 30G MISC, 1 each by Does not apply route daily, Disp: 100 each, Rfl: 3    blood glucose test strips (FREESTYLE LITE) strip, TEST ONE TIMES A DAY & AS NEEDED FOR SYMPTOMS OF IRREGULAR BLOOD GLUCOSE., Disp: 50 strip, Rfl: 3    Blood Glucose Monitoring Suppl (FREESTYLE LITE) FIGUEROA, USE DAILY WITH TEST STRIPS AND LANCETS, Disp: , Rfl: 0    glucose monitoring kit (FREESTYLE) monitoring kit, 1 kit by Does not apply route daily With test strips and supplies /lancets, Disp: 1 kit, Rfl: 0    Semaglutide,0.25 or 0.5MG/DOS, (OZEMPIC, 0.25 OR 0.5 MG/DOSE,) 2 MG/1.5ML SOPN, Inject 0.25 mg into the skin once a week, Disp: 1 pen, Rfl: 5   Allergies   Allergen Reactions    Pcn [Penicillins] Rash      Past Surgical History:   Procedure Laterality Date    BONE CYST EXCISION      tailbone    CARDIAC CATHETERIZATION  10/25/2019     SECTION      COLONOSCOPY  1996    colon polyps    WISDOM TOOTH EXTRACTION        Social History     Socioeconomic History    Marital status:      Spouse name: Not on file    Number of children: Not on file    Years of education: Not on file    Highest education level: Not on file   Occupational History    Not on file   Tobacco Use    Smoking status: Never    Smokeless tobacco: Never   Vaping Use    Vaping Use: Never used   Substance and Sexual Activity    Alcohol use: Yes     Comment: social    Drug use: No    Sexual activity: Not on file   Other Topics Concern    Not on file Social History Narrative    Not on file     Social Determinants of Health     Financial Resource Strain: Low Risk     Difficulty of Paying Living Expenses: Not hard at all   Food Insecurity: No Food Insecurity    Worried About Running Out of Food in the Last Year: Never true    Ran Out of Food in the Last Year: Never true   Transportation Needs: Not on file   Physical Activity: Not on file   Stress: Not on file   Social Connections: Not on file   Intimate Partner Violence: Not on file   Housing Stability: Not on file     Family History   Problem Relation Age of Onset    High Cholesterol Mother     Thyroid Disease Mother     Liver Disease Mother     High Blood Pressure Mother     High Blood Pressure Father     Heart Disease Father     Diabetes Brother     Brain Cancer Brother     Lung Cancer Brother     Ovarian Cancer Paternal Grandmother         PHYSICAL EXAM:    The patient was examined today 8/1/2022 with findings as follows:    CONSTITUTIONAL:    General Appearance: well-appearing, nontoxic, alert, no acute distress     Communication: understanding at normal conversational tones, normal voicing, speech intelligible    HEAD/FACE:    Head: atraumatic, normocephalic, no lesions    Facial Inspection: no lesions, healthy skin    Facial Strength: motor strength normal, symmetric strength, symmetric movement, motor strength    Sinuses: no sinus tenderness    Salivary Glands: no enlargements of parotid glands, no tenderness of parotid glands, no masses of parotid glands, clear salivary flow on palpation from Stensen's ducts, no duct stones of Stensen's duct, no enlargement of submandibular glands, no tenderness of submandibular glands, no masses of submandibular glands, clear salivary flow from [place]'s ducts, no stones of Marcio's ducts    Temporomandibular Joint: no crepitus with motion, no tenderness on palpation, no trismus, motion symmetric    EYES:    Pupils: PERRLA, extra-ocular movements intact, no nystagmus, sclera white, no redness of eyes, no watering of eyes    EARS:    Bilateral External Ears: no pits, no tags    Right External Ear: normally formed, no lesions, no mastoid tenderness    Left External Ear: normally formed, no lesions, no mastoid tenderness    Right External Auditory Canal: normal, healthy skin, no obstructing cerumen, no discharge    Left External Auditory Canal: normal, healthy skin, no obstructing cerumen, no discharge    Right Tympanic Membrane: normal landmarks, translucent, mobile to pneumatic otoscopy, no perforation    Left Tympanic Membrane: normal landmarks, translucent, mobile to pneumatic otoscopy, no perforation    Hearing: intact to spoken voice, intact to finger rub, Bryant midline, Right Ear: Rinne AC>BC, Left Left Ear: Rinne AC>BC    NOSE:    Nasal Skin: no lesions, no lacerations, no scars    Nasal Dorsum: symmetric with no visible or palpable deformities    Nasal Tip: normal symmetric nasal tip, normal nasal valves    Nasal Mucosa: normal, pink and moist    Septum: not markedly deformed, midline, no exposed vessels, no bleeding, no septal granuloma    Turbinates: normal size and conformation    Nasopharynx: normal    ORAL CAVITY/MOUTH:    Lips, teeth, gums: normal lips, normal gums, dentition intact, no dental pain on palpation    Oral Mucosa: normal, moist, no lesions    Palate: normal hard palate, normal soft palate, symmetric palatal elevation    Floor of Mouth: normal floor of mouth    Tongue: normal tongue, no lesions, no edema, no masses, normal mucosa, mobile    Tonsils: normal tonsils, symmetric, no lesions    Posterior pharynx: normal    HYPOPHARYNX/LARYNX:    Hypopharynx: normal hypopharynx, normal tongue base, normal pyriform sinus, normal vallecula    Larynx: normal epiglottis, normal false vocal cords, normal true vocal cords, normal glottic mobility, mild arytenoid edema, no post-cricoid edema, no subglottic stenosis    NECK:    Neck: no masses, trachea midline, normal range of motion, no cysts or pits, no tenderness to palpation    Thyroid: normal thyroid, no enlargement, no tenderness, no nodules    LYMPH NODES:    Cervical: 0.5 palpable submental lymph node enlargement midline    RESPIRATORY:    Inspection/Auscultation: good air movement, chest expands symmetrically, normal breath sounds, no wheezing, no stridor    CARDIOVASCULAR SYSTEM:    Auscultation: regular rate and rhythm, carotid pulse normal, no carotid thrills, no carotid bruits    Observation/Palpation of Peripheral Vascular System: no varicosities, no cyanosis, no edema    SKIN:    General Appearance: no lesions, warm and dry, normal turgor, no bruising    NEUROLOGICAL SYSTEM:    Orientation: oriented to time, oriented to place, oriented to person    Cranial Nerves: Cranial Nerves II-XII intact, normal facial movement    PSYCHIATRIC:    Mood and affect: normal mood, normal affect        Assessment and Plan:    She has a benign submental lymph node. Recent ultrasound corroborates this impression. Mention of a possible parathyroid adenoma is also made, but as she has had normal recent and serial calcium levels, no therapeutic intervention for such possible entity is warranted, and the known thyroid nodules are unchanged. The patient and/or caregiver is advised on the management of the thyroid nodule. The rationale for lab testing and imaging with ultrasound or other means is discussed. We have discussed that nodules less than 1 cm are generally managed with observation. In nodules greater that 1 cm, but less than 4 cm, fine needle aspiration biopsy or serial ultrasound is warranted. Lesions over four centimeters should be biopsied or removed due to the increased risk of malignancy. We have discussed that rapid growth, associated voice changes, male gender, age less than 21 years and over 36years of age, history of radiation exposure, and family history may be related to increased risk of malignancy.   We have discussed that nodules suspicious for or with proven malignancy, or those causing pain, breathing or swallowing difficulties, or secreting excess thyroid hormone may benefit from surgical treatment. The patient and/or caregiver is able to state an understanding of these recommendations and is agreeable to the treatment plan. She has some mild laryngeal irritation likely from reflux disease, but as she is otherwise asymptomatic, conservative treatment measures of dietary moderation and head of bed elevation is advised. The patient and/or caregiver is advised on proper voicing with the avoidance of vocal straining, resting when symptoms of vocal fatigue are noted, the avoidance of excess vocal volume, and the maintenance of adequate hydration. The patient and/or caregiver is to notify the office if no improvement or worsening of symptoms is noted prior to the scheduled follow-up for sooner evaluation. The patient and/or caregiver is able to state an understanding of these recommendations and is agreeable to the treatment plan. The patient and/or caregiver is advised on symptom management with the use of prescribed medication, weight loss, dietary changes with the avoidance of caffeine and carbonated beverages, the avoidance of heavy, spicy, or fatty meals and breaking meals up into several small meals throughout the day, the avoidance of eating less than two hours prior to bedtime, the elevation of the head of bed, and the practice of laryngeal hygiene with frequent sips of water to clear the throat and to maintain adequate hydration. The patient and/or caregiver is to notify the office if no improvement or worsening of symptoms is noted prior to the scheduled follow-up for sooner evaluation.   We have discussed that the safety of long term use of certain agents is uncertain and that should long term therapy be needed that loss of bone density, decreased absorption of some vitamins and minerals, and injury to internal organs has been reported. Weaning off of these medications when able may have long term benefits in reducing these risks. The patient and/or caregiver is able to state an understanding of these recommendations and is agreeable to the treatment plan. Diagnosis Orders   1. Enlargement of lymph node        2. Multiple thyroid nodules        3. Laryngopharyngeal reflux (LPR)           Return if symptoms worsen or fail to improve. The patient and/or caregiver is to notify the office if no improvement or worsening of symptoms is noted prior to the scheduled follow-up for sooner evaluation. The patient and/or caregiver is able to state an understanding of these recommendations and is agreeable to the treatment plan. --Ector Camargo MD on 8/1/2022 at 11:20 AM    An electronic signature was used to authenticate this note.

## 2022-12-30 ENCOUNTER — HOSPITAL ENCOUNTER (OUTPATIENT)
Age: 57
Discharge: HOME | End: 2022-12-30
Payer: COMMERCIAL

## 2022-12-30 DIAGNOSIS — E11.9 TYPE 2 DIABETES MELLITUS WITHOUT COMPLICATION, WITHOUT LONG-TERM CURRENT USE OF INSULIN (HCC): ICD-10-CM

## 2022-12-30 PROCEDURE — 83036 HEMOGLOBIN GLYCOSYLATED A1C: CPT

## 2023-01-01 LAB
ESTIMATED AVERAGE GLUCOSE: 177 MG/DL
HBA1C MFR BLD: 7.8 % (ref 4–6)

## 2023-09-13 ENCOUNTER — HOSPITAL ENCOUNTER (OUTPATIENT)
Age: 58
Discharge: HOME | End: 2023-09-13
Payer: COMMERCIAL

## 2023-09-13 DIAGNOSIS — E11.9 TYPE 2 DIABETES MELLITUS WITHOUT COMPLICATION, WITHOUT LONG-TERM CURRENT USE OF INSULIN (HCC): ICD-10-CM

## 2023-09-13 DIAGNOSIS — E78.5 HYPERLIPIDEMIA LDL GOAL <70: ICD-10-CM

## 2023-09-13 LAB
CHOLEST SERPL-MCNC: 198 MG/DL (ref 0–199)
CHOLESTEROL/HDL RATIO: 4
EST. AVERAGE GLUCOSE BLD GHB EST-MCNC: 183 MG/DL
HBA1C MFR BLD: 8 % (ref 4–6)
HDLC SERPL-MCNC: 45 MG/DL (ref 0–40)
LDLC SERPL CALC-MCNC: 121 MG/DL (ref 0–100)
TRIGL SERPL-MCNC: 160 MG/DL (ref 0–149)
VLDLC SERPL CALC-MCNC: 32 MG/DL

## 2023-09-13 PROCEDURE — 36415 COLL VENOUS BLD VENIPUNCTURE: CPT

## 2023-09-13 PROCEDURE — 82043 UR ALBUMIN QUANTITATIVE: CPT

## 2023-09-13 PROCEDURE — 82570 ASSAY OF URINE CREATININE: CPT

## 2023-09-13 PROCEDURE — 80061 LIPID PANEL: CPT

## 2023-09-13 PROCEDURE — 83036 HEMOGLOBIN GLYCOSYLATED A1C: CPT

## 2023-09-14 LAB
CREAT UR-MCNC: 179 MG/DL (ref 28–217)
MICROALBUMIN UR-MCNC: 16 MG/L (ref 0–20)
MICROALBUMIN/CREAT UR-RTO: 9 MCG/MG CREAT (ref 0–25)

## 2023-10-30 ENCOUNTER — OFFICE VISIT (OUTPATIENT)
Dept: OBGYN | Age: 58
End: 2023-10-30
Payer: COMMERCIAL

## 2023-10-30 ENCOUNTER — ANCILLARY PROCEDURE (OUTPATIENT)
Dept: OBGYN | Age: 58
End: 2023-10-30
Payer: COMMERCIAL

## 2023-10-30 ENCOUNTER — HOSPITAL ENCOUNTER (OUTPATIENT)
Age: 58
Setting detail: SPECIMEN
Discharge: HOME | End: 2023-10-30
Payer: COMMERCIAL

## 2023-10-30 VITALS
HEIGHT: 61 IN | WEIGHT: 157.2 LBS | BODY MASS INDEX: 29.68 KG/M2 | DIASTOLIC BLOOD PRESSURE: 70 MMHG | SYSTOLIC BLOOD PRESSURE: 114 MMHG

## 2023-10-30 DIAGNOSIS — Z80.41 FAMILY HISTORY OF OVARIAN CANCER: ICD-10-CM

## 2023-10-30 DIAGNOSIS — R10.2 PELVIC PAIN: ICD-10-CM

## 2023-10-30 DIAGNOSIS — Z12.4 CERVICAL CANCER SCREENING: ICD-10-CM

## 2023-10-30 DIAGNOSIS — R10.2 PELVIC PAIN: Primary | ICD-10-CM

## 2023-10-30 DIAGNOSIS — N89.8 VAGINAL DISCHARGE: ICD-10-CM

## 2023-10-30 DIAGNOSIS — Z11.3 ROUTINE SCREENING FOR STI (SEXUALLY TRANSMITTED INFECTION): ICD-10-CM

## 2023-10-30 DIAGNOSIS — N94.10 DYSPAREUNIA, FEMALE: ICD-10-CM

## 2023-10-30 LAB
CANDIDA SPECIES: NEGATIVE
GARDNERELLA VAGINALIS: POSITIVE
SOURCE: ABNORMAL
TRICHOMONAS: NEGATIVE

## 2023-10-30 PROCEDURE — 87624 HPV HI-RISK TYP POOLED RSLT: CPT

## 2023-10-30 PROCEDURE — 99203 OFFICE O/P NEW LOW 30 MIN: CPT

## 2023-10-30 PROCEDURE — 87389 HIV-1 AG W/HIV-1&-2 AB AG IA: CPT

## 2023-10-30 PROCEDURE — 87491 CHLMYD TRACH DNA AMP PROBE: CPT

## 2023-10-30 PROCEDURE — 87480 CANDIDA DNA DIR PROBE: CPT

## 2023-10-30 PROCEDURE — 3074F SYST BP LT 130 MM HG: CPT

## 2023-10-30 PROCEDURE — G0145 SCR C/V CYTO,THINLAYER,RESCR: HCPCS

## 2023-10-30 PROCEDURE — 86780 TREPONEMA PALLIDUM: CPT

## 2023-10-30 PROCEDURE — 87510 GARDNER VAG DNA DIR PROBE: CPT

## 2023-10-30 PROCEDURE — 3078F DIAST BP <80 MM HG: CPT

## 2023-10-30 PROCEDURE — 87591 N.GONORRHOEAE DNA AMP PROB: CPT

## 2023-10-30 PROCEDURE — 86803 HEPATITIS C AB TEST: CPT

## 2023-10-30 PROCEDURE — 87660 TRICHOMONAS VAGIN DIR PROBE: CPT

## 2023-10-30 ASSESSMENT — ENCOUNTER SYMPTOMS
NAUSEA: 0
ABDOMINAL PAIN: 0
COLOR CHANGE: 0
VOMITING: 0
ABDOMINAL DISTENTION: 0
WHEEZING: 0
DIARRHEA: 0
CONSTIPATION: 0
SHORTNESS OF BREATH: 0
COUGH: 0
CHEST TIGHTNESS: 0

## 2023-10-30 NOTE — PROGRESS NOTES
CHIEF COMPLAINT:     Chief Complaint   Patient presents with    Abdominal Pain     Patient presents today following in house gyn sono for lower abdominal pain near ovaries. Patient states symptoms started 10/15 and felt like a knife was being twisted inside her. Pain has since subsided. Patient did start ozempic a week prior to pain. HPI:   Bonnie Hence presents today with concerns for pelvic pain that started on 10/15/23. She reports this started about a week after she stared her ozempic for diabetes. The pain started in the evening and got worse as the night went on and lasted about three days. This pain felt like it was in the left ovary and was a sharp,stabbing pain. The pain is no longer present. She has also noticed some brown/yellow discharge and some vaginal pain. She does reports she has pain with intercourse at first due to some dryness. This pain is not new. This pain subsides as intercourse progresses. She and her partner do not use lubrication. She reports her grandmother had ovarian cancer - diagnosed in her 80s. She does report an abnormal pap smear when her daughter was in elementary school. She declines any abnormal pap smears since then. GYNECOLOGIC HISTORY:     Menopause: postmenopausal  No LMP recorded (lmp unknown). Patient is postmenopausal.    Last pap: 2015    Sexually active: Yes  New sexual partner: no    STD history: No    Hormone Replacement: no    Birth control method :No  Permanent Sterilization: No      REVIEW OF SYSTEMS:  Review of Systems   Constitutional:  Negative for activity change, chills, diaphoresis, fatigue and fever. HENT:  Negative for congestion. Respiratory:  Negative for cough, chest tightness, shortness of breath and wheezing. Cardiovascular:  Negative for chest pain, palpitations and leg swelling. Gastrointestinal:  Negative for abdominal distention, abdominal pain, constipation, diarrhea, nausea and vomiting.    Genitourinary:  Positive for

## 2023-10-31 LAB
HCV AB SERPL QL IA: NONREACTIVE
HIV 1+2 AB+HIV1 P24 AG SERPL QL IA: NONREACTIVE
T PALLIDUM AB SER QL IA: NONREACTIVE

## 2023-11-01 LAB
HPV I/H RISK 4 DNA CVX QL NAA+PROBE: NOT DETECTED
HPV SAMPLE: NORMAL
HPV, INTERPRETATION: NORMAL
HPV16 DNA CVX QL NAA+PROBE: NOT DETECTED
HPV18 DNA CVX QL NAA+PROBE: NOT DETECTED
SPECIMEN DESCRIPTION: NORMAL

## 2023-11-01 RX ORDER — METRONIDAZOLE 500 MG/1
500 TABLET ORAL 2 TIMES DAILY
Qty: 14 TABLET | Refills: 0 | Status: SHIPPED | OUTPATIENT
Start: 2023-11-01 | End: 2023-11-08

## 2023-11-03 LAB
C TRACH DNA SPEC QL PROBE+SIG AMP: NEGATIVE
CYTOLOGY REPORT: NORMAL
N GONORRHOEA DNA SPEC QL PROBE+SIG AMP: NEGATIVE
SPECIMEN DESCRIPTION: NORMAL

## 2023-11-11 LAB
Lab: NEGATIVE
Lab: NORMAL

## 2023-12-28 ENCOUNTER — HOSPITAL ENCOUNTER (OUTPATIENT)
Dept: WOMENS IMAGING | Age: 58
Discharge: HOME | End: 2023-12-30
Payer: COMMERCIAL

## 2023-12-28 DIAGNOSIS — Z12.31 VISIT FOR SCREENING MAMMOGRAM: ICD-10-CM

## 2023-12-28 PROCEDURE — 77063 BREAST TOMOSYNTHESIS BI: CPT

## 2024-03-26 ENCOUNTER — HOSPITAL ENCOUNTER (OUTPATIENT)
Age: 59
Discharge: HOME | End: 2024-03-26
Payer: COMMERCIAL

## 2024-03-26 DIAGNOSIS — E11.9 TYPE 2 DIABETES MELLITUS WITHOUT COMPLICATION, WITHOUT LONG-TERM CURRENT USE OF INSULIN (HCC): ICD-10-CM

## 2024-03-26 LAB
EST. AVERAGE GLUCOSE BLD GHB EST-MCNC: 235 MG/DL
HBA1C MFR BLD: 9.8 % (ref 4–6)

## 2024-03-26 PROCEDURE — 83036 HEMOGLOBIN GLYCOSYLATED A1C: CPT

## 2024-03-26 PROCEDURE — 36415 COLL VENOUS BLD VENIPUNCTURE: CPT

## 2024-04-18 ENCOUNTER — HOSPITAL ENCOUNTER (OUTPATIENT)
Dept: ULTRASOUND IMAGING | Age: 59
Discharge: HOME | End: 2024-04-20
Attending: INTERNAL MEDICINE
Payer: COMMERCIAL

## 2024-04-18 ENCOUNTER — HOSPITAL ENCOUNTER (OUTPATIENT)
Age: 59
Discharge: HOME | End: 2024-04-18
Attending: INTERNAL MEDICINE
Payer: COMMERCIAL

## 2024-04-18 DIAGNOSIS — R10.11 RUQ ABDOMINAL PAIN: ICD-10-CM

## 2024-04-18 DIAGNOSIS — I10 ESSENTIAL HYPERTENSION: ICD-10-CM

## 2024-04-18 LAB
ANION GAP SERPL CALCULATED.3IONS-SCNC: 11 MMOL/L (ref 9–17)
BUN SERPL-MCNC: 13 MG/DL (ref 6–20)
BUN/CREAT SERPL: 22 (ref 9–20)
CALCIUM SERPL-MCNC: 9.3 MG/DL (ref 8.6–10.4)
CHLORIDE SERPL-SCNC: 99 MMOL/L (ref 98–107)
CO2 SERPL-SCNC: 29 MMOL/L (ref 20–31)
CREAT SERPL-MCNC: 0.6 MG/DL (ref 0.5–0.9)
GFR SERPL CREATININE-BSD FRML MDRD: >90 ML/MIN/1.73M2
GLUCOSE SERPL-MCNC: 272 MG/DL (ref 70–99)
POTASSIUM SERPL-SCNC: 3.9 MMOL/L (ref 3.7–5.3)
SODIUM SERPL-SCNC: 139 MMOL/L (ref 135–144)

## 2024-04-18 PROCEDURE — 80048 BASIC METABOLIC PNL TOTAL CA: CPT

## 2024-04-18 PROCEDURE — 76705 ECHO EXAM OF ABDOMEN: CPT

## 2024-04-18 PROCEDURE — 36415 COLL VENOUS BLD VENIPUNCTURE: CPT

## 2024-07-16 ENCOUNTER — HOSPITAL ENCOUNTER (OUTPATIENT)
Age: 59
Discharge: HOME | End: 2024-07-16
Payer: COMMERCIAL

## 2024-07-16 DIAGNOSIS — E11.9 TYPE 2 DIABETES MELLITUS WITHOUT COMPLICATION, WITHOUT LONG-TERM CURRENT USE OF INSULIN (HCC): ICD-10-CM

## 2024-07-16 LAB
EST. AVERAGE GLUCOSE BLD GHB EST-MCNC: 214 MG/DL
HBA1C MFR BLD: 9.1 % (ref 4–6)

## 2024-07-16 PROCEDURE — 83036 HEMOGLOBIN GLYCOSYLATED A1C: CPT

## 2024-07-16 PROCEDURE — 36415 COLL VENOUS BLD VENIPUNCTURE: CPT

## 2024-10-31 ENCOUNTER — OFFICE VISIT (OUTPATIENT)
Dept: OBGYN | Age: 59
End: 2024-10-31
Payer: COMMERCIAL

## 2024-10-31 ENCOUNTER — HOSPITAL ENCOUNTER (OUTPATIENT)
Age: 59
Setting detail: SPECIMEN
Discharge: HOME | End: 2024-10-31
Payer: COMMERCIAL

## 2024-10-31 VITALS
BODY MASS INDEX: 29.45 KG/M2 | HEIGHT: 61 IN | SYSTOLIC BLOOD PRESSURE: 122 MMHG | DIASTOLIC BLOOD PRESSURE: 68 MMHG | WEIGHT: 156 LBS

## 2024-10-31 DIAGNOSIS — Z12.31 VISIT FOR SCREENING MAMMOGRAM: ICD-10-CM

## 2024-10-31 DIAGNOSIS — Z01.419 WOMEN'S ANNUAL ROUTINE GYNECOLOGICAL EXAMINATION: ICD-10-CM

## 2024-10-31 DIAGNOSIS — N95.1 VAGINAL DRYNESS, MENOPAUSAL: ICD-10-CM

## 2024-10-31 DIAGNOSIS — Z01.419 WOMEN'S ANNUAL ROUTINE GYNECOLOGICAL EXAMINATION: Primary | ICD-10-CM

## 2024-10-31 PROCEDURE — 99396 PREV VISIT EST AGE 40-64: CPT | Performed by: OBSTETRICS & GYNECOLOGY

## 2024-10-31 PROCEDURE — 3078F DIAST BP <80 MM HG: CPT | Performed by: OBSTETRICS & GYNECOLOGY

## 2024-10-31 PROCEDURE — G0145 SCR C/V CYTO,THINLAYER,RESCR: HCPCS

## 2024-10-31 PROCEDURE — 3074F SYST BP LT 130 MM HG: CPT | Performed by: OBSTETRICS & GYNECOLOGY

## 2024-10-31 RX ORDER — CONJUGATED ESTROGENS 0.62 MG/G
CREAM VAGINAL
Qty: 30 G | Refills: 2 | Status: SHIPPED | OUTPATIENT
Start: 2024-10-31

## 2024-10-31 NOTE — PROGRESS NOTES
YEARLY PHYSICAL    Date of service: 10/31/2024    Mireya Tian  Is a 59 y.o.   female    PT's PCP is: Kelvin Daniels MD     : 1965                                             Subjective:       No LMP recorded (lmp unknown). Patient is postmenopausal.     Are your menses regular: not applicable    OB History    Para Term  AB Living   1 1 1         SAB IAB Ectopic Molar Multiple Live Births                    # Outcome Date GA Lbr Kranthi/2nd Weight Sex Type Anes PTL Lv   1 Term  38w0d   F CS-Unspec           Social History     Tobacco Use   Smoking Status Never   Smokeless Tobacco Never        Social History     Substance and Sexual Activity   Alcohol Use Yes    Alcohol/week: 1.0 standard drink of alcohol    Types: 1 Drinks containing 0.5 oz of alcohol per week    Comment: social       Family History   Problem Relation Age of Onset    High Cholesterol Mother     Thyroid Disease Mother     Liver Disease Mother     High Blood Pressure Mother     High Blood Pressure Father     Heart Disease Father     Colon Cancer Father     Heart Surgery Father     Breast Cancer Sister     Diabetes Brother     Brain Cancer Brother     Lung Cancer Brother     Diabetes Brother     Stroke Brother     Diabetes Brother     Ovarian Cancer Paternal Grandmother     Cancer Paternal Grandmother         Ovarian    Breast Cancer Niece        Allergies: Ozempic (0.25 or 0.5 mg-dose) [semaglutide(0.25 or 0.5mg-dos)] and Pcn [penicillins]      Current Outpatient Medications:     estrogens conjugated (PREMARIN) 0.625 MG/GM CREA vaginal cream, Use 0.5 g vaginally nightly for 2 weeks.  Then use 0.5 g vaginally 2 nights weekly, Disp: 30 g, Rfl: 2    glimepiride (AMARYL) 4 MG tablet, Take 1 tablet by mouth 2 times daily, Disp: , Rfl:     bisoprolol-hydroCHLOROthiazide (ZIAC) 5-6.25 MG per tablet, TAKE 1 TABLET BY MOUTH EVERY DAY, Disp: 90 tablet, Rfl: 1

## 2024-11-08 LAB — CYTOLOGY REPORT: NORMAL

## 2025-04-14 ENCOUNTER — HOSPITAL ENCOUNTER (OUTPATIENT)
Age: 60
Discharge: HOME | End: 2025-04-14
Payer: COMMERCIAL

## 2025-04-14 DIAGNOSIS — E78.5 HYPERLIPIDEMIA LDL GOAL <70: ICD-10-CM

## 2025-04-14 DIAGNOSIS — E11.9 TYPE 2 DIABETES MELLITUS WITHOUT COMPLICATION, WITHOUT LONG-TERM CURRENT USE OF INSULIN: ICD-10-CM

## 2025-04-14 LAB
CHOLEST SERPL-MCNC: 219 MG/DL (ref 0–199)
CHOLESTEROL/HDL RATIO: 4.8
CREAT UR-MCNC: 65 MG/DL (ref 28–217)
EST. AVERAGE GLUCOSE BLD GHB EST-MCNC: 246 MG/DL
HBA1C MFR BLD: 10.2 % (ref 4–6)
HDLC SERPL-MCNC: 46 MG/DL
LDLC SERPL CALC-MCNC: 122 MG/DL (ref 0–100)
MICROALBUMIN UR-MCNC: <12 MG/L (ref 0–20)
MICROALBUMIN/CREAT UR-RTO: NORMAL MCG/MG CREAT (ref 0–25)
TRIGL SERPL-MCNC: 253 MG/DL (ref 0–149)
VLDLC SERPL CALC-MCNC: 51 MG/DL (ref 1–30)

## 2025-04-14 PROCEDURE — 83036 HEMOGLOBIN GLYCOSYLATED A1C: CPT

## 2025-04-14 PROCEDURE — 80061 LIPID PANEL: CPT

## 2025-04-14 PROCEDURE — 82570 ASSAY OF URINE CREATININE: CPT

## 2025-04-14 PROCEDURE — 82043 UR ALBUMIN QUANTITATIVE: CPT

## 2025-04-14 PROCEDURE — 36415 COLL VENOUS BLD VENIPUNCTURE: CPT

## 2025-08-04 ENCOUNTER — HOSPITAL ENCOUNTER (OUTPATIENT)
Dept: LAB | Age: 60
Discharge: HOME | End: 2025-08-04
Payer: COMMERCIAL

## 2025-08-04 DIAGNOSIS — E11.9 TYPE 2 DIABETES MELLITUS WITHOUT COMPLICATION, WITHOUT LONG-TERM CURRENT USE OF INSULIN (HCC): ICD-10-CM

## 2025-08-04 LAB
ANION GAP SERPL CALCULATED.3IONS-SCNC: 10 MMOL/L (ref 9–16)
BUN SERPL-MCNC: 9 MG/DL (ref 6–20)
BUN/CREAT SERPL: 13 (ref 9–20)
CALCIUM SERPL-MCNC: 9.2 MG/DL (ref 8.6–10.4)
CHLORIDE SERPL-SCNC: 104 MMOL/L (ref 98–107)
CO2 SERPL-SCNC: 27 MMOL/L (ref 20–31)
CREAT SERPL-MCNC: 0.7 MG/DL (ref 0.5–0.9)
EST. AVERAGE GLUCOSE BLD GHB EST-MCNC: 197 MG/DL
GFR, ESTIMATED: >90 ML/MIN/1.73M2
GLUCOSE SERPL-MCNC: 151 MG/DL (ref 74–99)
HBA1C MFR BLD: 8.5 % (ref 4–6)
POTASSIUM SERPL-SCNC: 3.8 MMOL/L (ref 3.7–5.3)
SODIUM SERPL-SCNC: 141 MMOL/L (ref 136–145)

## 2025-08-04 PROCEDURE — 80048 BASIC METABOLIC PNL TOTAL CA: CPT

## 2025-08-04 PROCEDURE — 36415 COLL VENOUS BLD VENIPUNCTURE: CPT

## 2025-08-04 PROCEDURE — 83036 HEMOGLOBIN GLYCOSYLATED A1C: CPT
